# Patient Record
Sex: FEMALE
[De-identification: names, ages, dates, MRNs, and addresses within clinical notes are randomized per-mention and may not be internally consistent; named-entity substitution may affect disease eponyms.]

---

## 2016-04-15 LAB — HCV AB SER-ACNC: NONREACTIVE

## 2019-07-25 PROBLEM — Z00.00 ENCOUNTER FOR PREVENTIVE HEALTH EXAMINATION: Status: ACTIVE | Noted: 2019-07-25

## 2019-08-16 ENCOUNTER — NON-APPOINTMENT (OUTPATIENT)
Age: 59
End: 2019-08-16

## 2019-08-16 ENCOUNTER — APPOINTMENT (OUTPATIENT)
Dept: OPHTHALMOLOGY | Facility: CLINIC | Age: 59
End: 2019-08-16
Payer: COMMERCIAL

## 2019-08-16 PROCEDURE — 92250 FUNDUS PHOTOGRAPHY W/I&R: CPT

## 2019-08-16 PROCEDURE — 76514 ECHO EXAM OF EYE THICKNESS: CPT

## 2019-08-16 PROCEDURE — 92083 EXTENDED VISUAL FIELD XM: CPT

## 2019-08-16 PROCEDURE — 99204 OFFICE O/P NEW MOD 45 MIN: CPT

## 2019-08-16 PROCEDURE — 92020 GONIOSCOPY: CPT

## 2024-02-24 ENCOUNTER — APPOINTMENT (OUTPATIENT)
Dept: RADIOLOGY | Facility: CLINIC | Age: 64
End: 2024-02-24
Payer: COMMERCIAL

## 2024-02-24 DIAGNOSIS — M25.561 CHRONIC PAIN OF RIGHT KNEE: ICD-10-CM

## 2024-02-24 DIAGNOSIS — G89.29 CHRONIC PAIN OF RIGHT KNEE: ICD-10-CM

## 2024-02-24 PROCEDURE — 73564 X-RAY EXAM KNEE 4 OR MORE: CPT

## 2024-03-05 ENCOUNTER — OFFICE VISIT (OUTPATIENT)
Dept: OBGYN CLINIC | Facility: CLINIC | Age: 64
End: 2024-03-05
Payer: COMMERCIAL

## 2024-03-05 ENCOUNTER — OFFICE VISIT (OUTPATIENT)
Dept: FAMILY MEDICINE CLINIC | Facility: CLINIC | Age: 64
End: 2024-03-05
Payer: COMMERCIAL

## 2024-03-05 VITALS
HEIGHT: 63 IN | BODY MASS INDEX: 21.26 KG/M2 | TEMPERATURE: 97.5 F | WEIGHT: 120 LBS | SYSTOLIC BLOOD PRESSURE: 155 MMHG | HEART RATE: 83 BPM | RESPIRATION RATE: 16 BRPM | DIASTOLIC BLOOD PRESSURE: 89 MMHG | OXYGEN SATURATION: 98 %

## 2024-03-05 VITALS
TEMPERATURE: 97.6 F | WEIGHT: 120.5 LBS | OXYGEN SATURATION: 98 % | HEART RATE: 98 BPM | SYSTOLIC BLOOD PRESSURE: 134 MMHG | BODY MASS INDEX: 22.75 KG/M2 | DIASTOLIC BLOOD PRESSURE: 88 MMHG | HEIGHT: 61 IN

## 2024-03-05 DIAGNOSIS — Z12.11 COLON CANCER SCREENING: ICD-10-CM

## 2024-03-05 DIAGNOSIS — E87.6 HYPOKALEMIA: ICD-10-CM

## 2024-03-05 DIAGNOSIS — Z12.31 ENCOUNTER FOR SCREENING MAMMOGRAM FOR MALIGNANT NEOPLASM OF BREAST: ICD-10-CM

## 2024-03-05 DIAGNOSIS — Z13.220 LIPID SCREENING: ICD-10-CM

## 2024-03-05 DIAGNOSIS — M17.11 PRIMARY OSTEOARTHRITIS OF RIGHT KNEE: Primary | ICD-10-CM

## 2024-03-05 DIAGNOSIS — E55.9 VITAMIN D DEFICIENCY: ICD-10-CM

## 2024-03-05 DIAGNOSIS — Z78.0 POST-MENOPAUSAL: ICD-10-CM

## 2024-03-05 DIAGNOSIS — M85.80 OSTEOPENIA, UNSPECIFIED LOCATION: ICD-10-CM

## 2024-03-05 DIAGNOSIS — R73.03 PREDIABETES: ICD-10-CM

## 2024-03-05 DIAGNOSIS — Z13.29 THYROID DISORDER SCREEN: ICD-10-CM

## 2024-03-05 DIAGNOSIS — Z76.89 ENCOUNTER TO ESTABLISH CARE: Primary | ICD-10-CM

## 2024-03-05 PROBLEM — Z86.69 HX OF MIGRAINE HEADACHES: Status: ACTIVE | Noted: 2024-03-05

## 2024-03-05 PROCEDURE — 99204 OFFICE O/P NEW MOD 45 MIN: CPT | Performed by: STUDENT IN AN ORGANIZED HEALTH CARE EDUCATION/TRAINING PROGRAM

## 2024-03-05 PROCEDURE — 99204 OFFICE O/P NEW MOD 45 MIN: CPT | Performed by: FAMILY MEDICINE

## 2024-03-05 RX ORDER — POTASSIUM CHLORIDE 600 MG/1
4 TABLET, FILM COATED, EXTENDED RELEASE ORAL
Qty: 90 TABLET | Refills: 0 | Status: SHIPPED | OUTPATIENT
Start: 2024-03-05

## 2024-03-05 RX ORDER — MECLIZINE HYDROCHLORIDE 25 MG/1
25 TABLET ORAL 3 TIMES DAILY PRN
COMMUNITY
Start: 2024-01-18 | End: 2025-01-17

## 2024-03-05 RX ORDER — NAPROXEN 500 MG/1
500 TABLET ORAL 2 TIMES DAILY WITH MEALS
Qty: 60 TABLET | Refills: 0 | Status: SHIPPED | OUTPATIENT
Start: 2024-03-05

## 2024-03-05 RX ORDER — LATANOPROST 50 UG/ML
1 SOLUTION/ DROPS OPHTHALMIC
COMMUNITY
Start: 2023-12-27

## 2024-03-05 NOTE — ASSESSMENT & PLAN NOTE
Patient denies any hx of.   Review of DEXA  from 2011 does show osteopenia. I will check patients Vit D level to advise on supplementation , in addition I have ordered and staff have scheduled patient for a DEXA scan. Will f/u pending results.

## 2024-03-05 NOTE — ASSESSMENT & PLAN NOTE
Patient is a 64-year-old female with past medical history prediabetes and hyperlipidemia presenting today to establish care.  AWV last completed  9/13/2023  Mammo 10/19/2022  Colonoscopy 7/7/2014  Dxa- 9/29/2011    Mammogram and DEXA has been ordered, as well as routine blood work, will f/u pending results. Referral to GI placed for Colon cancer screening    Patient does endorse some anxiety around her weight   States that she eats primarily a KETO diet to try and stay healthy.

## 2024-03-05 NOTE — PROGRESS NOTES
Name: Albertina Alvarado      : 1960      MRN: [id number]  Encounter Provider: Cyndy Carrasco DO  Encounter Date: 3/5/2024   Encounter department: [facility]    Assessment & Plan     1. Encounter to establish care  Assessment & Plan:  Patient is a 64-year-old female with past medical history prediabetes and hyperlipidemia presenting today to establish care.  AWV last completed  2023  Mammo 10/19/2022  Colonoscopy 2014  Dxa- 2011    Mammogram and DEXA has been ordered, as well as routine blood work, will f/u pending results. Referral to GI placed for Colon cancer screening    Patient does endorse some anxiety around her weight   States that she eats primarily a KETO diet to try and stay healthy.      2. Encounter for screening mammogram for malignant neoplasm of breast  -     Mammo screening bilateral w 3d & cad; Future    3. Post-menopausal  -     DXA bone density spine hip and pelvis; Future; Expected date: 2024  -     CBC and Platelet; Future    4. Prediabetes  -     Comprehensive metabolic panel; Future  -     Hemoglobin A1C; Future  -     CBC and Platelet; Future    5. Thyroid disorder screen  -     TSH, 3rd generation with Free T4 reflex; Future    6. Lipid screening  -     Lipid panel; Future    7. Vitamin D deficiency  Assessment & Plan:  Vit D level pending. Patient taking MTV, no exclusive supplement. Will f/u pending results.     Orders:  -     Vitamin D 25 hydroxy; Future    8. Colon cancer screening  -     Ambulatory Referral to Gastroenterology; Future    9. Osteopenia, unspecified location  Assessment & Plan:  Patient denies any hx of.   Review of DEXA  from  does show osteopenia. I will check patients Vit D level to advise on supplementation , in addition I have ordered and staff have scheduled patient for a DEXA scan. Will f/u pending results.       10. Hypokalemia  Assessment & Plan:  Patient reports long hx of hypokalemia   Has been  "taking half tablet nightly, recent blood work from ED showing Potasium wnl.  Will recheck levels and advise on any dose changes, for now will refill at 4mg qhs.            Subjective      Patient is a 64-year-old female with past medical history prediabetes and hyperlipidemia presenting today to establish care.  Patient has no acute concerns or complaints  Endorses falling behind on preventative screenings after relocating from [facility]      Review of Systems   Constitutional:  Negative for chills and fever.   HENT:  Negative for congestion and rhinorrhea.    Respiratory:  Negative for cough and shortness of breath.    Cardiovascular:  Negative for chest pain and palpitations.   Gastrointestinal:  Negative for abdominal pain, constipation, diarrhea, nausea and vomiting.   Neurological:  Negative for dizziness and headaches.       Current Outpatient Medications on File Prior to Visit   Medication Sig   • aspirin (ECOTRIN LOW STRENGTH) 81 mg EC tablet Take 81 mg by mouth   • atorvastatin (LIPITOR) 10 mg tablet TAKE 1 TABLET NIGHTLY FOR HIGH AMOUNT OF FATS IN THE BLOOD.   • latanoprost (XALATAN) 0.005 % ophthalmic solution Administer 1 drop to both eyes daily at bedtime   • meclizine (ANTIVERT) 25 mg tablet Take 25 mg by mouth Three times daily as needed for dizziness   • [DISCONTINUED] potassium chloride (KLOR-CON) 8 MEQ tablet Take 8 mEq by mouth 2 (two) times a day       Objective     /88 (BP Location: Left arm, Patient Position: Sitting, Cuff Size: Large)   Pulse 98   Temp 97.6 °F (36.4 °C) (Temporal)   Ht 5' 1\" (1.549 m)   Wt 54.7 kg (120 lb 8 oz)   SpO2 98%   BMI 22.77 kg/m²     Physical Exam  Vitals reviewed.   Constitutional:       Appearance: Normal appearance.   HENT:      Head: Normocephalic and atraumatic.   Eyes:      Extraocular Movements: Extraocular movements intact.   Cardiovascular:      Rate and Rhythm: Normal rate and regular rhythm.      Heart sounds: Normal heart sounds.   Pulmonary:      " Effort: Pulmonary effort is normal.      Breath sounds: Normal breath sounds.   Musculoskeletal:      Cervical back: Neck supple. No tenderness.   Lymphadenopathy:      Cervical: No cervical adenopathy.   Neurological:      General: No focal deficit present.      Mental Status: She is alert and oriented to person, place, and time.   Psychiatric:         Mood and Affect: Mood normal.         Behavior: Behavior normal.           Cyndy Carrasco, DO

## 2024-03-05 NOTE — ASSESSMENT & PLAN NOTE
Patient reports long hx of hypokalemia   Has been taking half tablet nightly, recent blood work from ED showing Potasium wnl.  Will recheck levels and advise on any dose changes, for now will refill at 4mg qhs.

## 2024-03-05 NOTE — PROGRESS NOTES
Subjective:    Chief Complaint   Patient presents with    Right Knee - Pain       Albertina Alvarado is a 64 y.o. female complains of right knee pain. Onset of the symptoms was several weeks ago.  Mechanism of injury:  was standing at sink and backbended her knee resulting in pain . Aggravating factors: walking  and weight bearing. Treatment to date: OTC analgesics which are somewhat effective and rest. Symptoms have progressed to a point and plateaued.      The following portions were reviewed and updated as needed: allergies, current medications, past medical history, past social history, past surgical history and problem list.    Review of Systems   Constitutional: Negative for fever.   HENT: Negative for dental problem and headaches.    Eyes: Negative for vision loss.   Respiratory: Negative for cough and shortness of breath.    Cardiovascular: Negative for leg swelling and palpitations.   Gastrointestinal: Negative for constipation and diarrhea.   Genitourinary: Negative for bladder incontinence and difficulty urinating.   Musculoskeletal: Negative for back pain and difficulty walking.   Skin: Negative for rash and ulcer.   Neurological: Negative for dizziness and headaches.   Hem/Lymph/Immuno: Negative for blood clots. Does not bruise/bleed easily.   Psychiatric/Behavioral: Negative for confusion.         Objective:  General: no acute distress, non toxic, AAO x3   Skin: no skin changes, no rashes, no wounds or laceration  Vasculature: normal cap refill, no LE edema, normal popliteal and dorsalis pedis pulse  Neurologic:   Musculoskeletal: right KNEE EXAM  Gait: limping gait negative, able to weight bear without difficulty  Inspection: No gross deformity, no redness or warmth   Effusion: negative   Medial joint line TTP: positive  Lateral joint line TTP: negative  ROM: Full flexion and extension  [facility]'s: negative,   Instability to varus/valgus stress: negative  Anterior Drawer: negative   Lachman's test:  negative  Posterior Drawer: negative          Imaging:       Assessment/Plan:  1. Primary osteoarthritis of right knee  64-year-old female patient presenting for acute right knee pain.  Radiographs from the emergency room were reviewed.  I do notice some degenerative changes in the medial compartment with subchondral sclerosis and osteophyte formation.  My clinical impression is that patient is suffering from exacerbation of underlying knee osteoarthritis.  Discussed the nature of knee osteoarthritis and the recommended treatment plan.  Patient will begin with naproxen 500 mg twice daily with food.  Advised to ice the affected area and apply Voltaren gel.  She will be provided a hinged knee brace for comfort use.  We discussed role for corticosteroid injection however she declines at today's visit.  We will revisit again in about 1 month and if symptoms are persisting we can consider the corticosteroid injection at that time  - Ambulatory Referral to Orthopedic Surgery  - Durable Medical Equipment  - naproxen (Naprosyn) 500 mg tablet; Take 1 tablet (500 mg total) by mouth 2 (two) times a day with meals  Dispense: 60 tablet; Refill: 0

## 2024-03-07 ENCOUNTER — APPOINTMENT (OUTPATIENT)
Dept: LAB | Facility: CLINIC | Age: 64
End: 2024-03-07
Payer: COMMERCIAL

## 2024-03-07 DIAGNOSIS — Z13.220 LIPID SCREENING: ICD-10-CM

## 2024-03-07 DIAGNOSIS — Z78.0 POST-MENOPAUSAL: ICD-10-CM

## 2024-03-07 DIAGNOSIS — E55.9 VITAMIN D DEFICIENCY: ICD-10-CM

## 2024-03-07 DIAGNOSIS — Z13.29 THYROID DISORDER SCREEN: ICD-10-CM

## 2024-03-07 DIAGNOSIS — R73.03 PREDIABETES: ICD-10-CM

## 2024-03-07 LAB
25(OH)D3 SERPL-MCNC: 49.8 NG/ML (ref 30–100)
ALBUMIN SERPL BCP-MCNC: 4.1 G/DL (ref 3.5–5)
ALP SERPL-CCNC: 51 U/L (ref 34–104)
ALT SERPL W P-5'-P-CCNC: 12 U/L (ref 7–52)
ANION GAP SERPL CALCULATED.3IONS-SCNC: 9 MMOL/L
AST SERPL W P-5'-P-CCNC: 20 U/L (ref 13–39)
BILIRUB SERPL-MCNC: 0.31 MG/DL (ref 0.2–1)
BUN SERPL-MCNC: 18 MG/DL (ref 5–25)
CALCIUM SERPL-MCNC: 9.5 MG/DL (ref 8.4–10.2)
CHLORIDE SERPL-SCNC: 105 MMOL/L (ref 96–108)
CHOLEST SERPL-MCNC: 223 MG/DL
CO2 SERPL-SCNC: 28 MMOL/L (ref 21–32)
CREAT SERPL-MCNC: 0.72 MG/DL (ref 0.6–1.3)
ERYTHROCYTE [DISTWIDTH] IN BLOOD BY AUTOMATED COUNT: 14.6 % (ref 11.6–15.1)
EST. AVERAGE GLUCOSE BLD GHB EST-MCNC: 123 MG/DL
GFR SERPL CREATININE-BSD FRML MDRD: 87 ML/MIN/1.73SQ M
GLUCOSE P FAST SERPL-MCNC: 83 MG/DL (ref 65–99)
HBA1C MFR BLD: 5.9 %
HCT VFR BLD AUTO: 42 % (ref 34.8–46.1)
HDLC SERPL-MCNC: 71 MG/DL
HGB BLD-MCNC: 13.5 G/DL (ref 11.5–15.4)
LDLC SERPL CALC-MCNC: 141 MG/DL (ref 0–100)
MCH RBC QN AUTO: 31.7 PG (ref 26.8–34.3)
MCHC RBC AUTO-ENTMCNC: 32.1 G/DL (ref 31.4–37.4)
MCV RBC AUTO: 99 FL (ref 82–98)
NONHDLC SERPL-MCNC: 152 MG/DL
PLATELET # BLD AUTO: 261 THOUSANDS/UL (ref 149–390)
PMV BLD AUTO: 9.9 FL (ref 8.9–12.7)
POTASSIUM SERPL-SCNC: 5 MMOL/L (ref 3.5–5.3)
PROT SERPL-MCNC: 7.2 G/DL (ref 6.4–8.4)
RBC # BLD AUTO: 4.26 MILLION/UL (ref 3.81–5.12)
SODIUM SERPL-SCNC: 142 MMOL/L (ref 135–147)
TRIGL SERPL-MCNC: 53 MG/DL
TSH SERPL DL<=0.05 MIU/L-ACNC: 2.6 UIU/ML (ref 0.45–4.5)
WBC # BLD AUTO: 6.22 THOUSAND/UL (ref 4.31–10.16)

## 2024-03-07 PROCEDURE — 36415 COLL VENOUS BLD VENIPUNCTURE: CPT

## 2024-03-07 PROCEDURE — 80053 COMPREHEN METABOLIC PANEL: CPT

## 2024-03-07 PROCEDURE — 84443 ASSAY THYROID STIM HORMONE: CPT

## 2024-03-07 PROCEDURE — 85027 COMPLETE CBC AUTOMATED: CPT

## 2024-03-07 PROCEDURE — 80061 LIPID PANEL: CPT

## 2024-03-07 PROCEDURE — 83036 HEMOGLOBIN GLYCOSYLATED A1C: CPT

## 2024-03-07 PROCEDURE — 82306 VITAMIN D 25 HYDROXY: CPT

## 2024-03-13 ENCOUNTER — TELEPHONE (OUTPATIENT)
Age: 64
End: 2024-03-13

## 2024-03-13 NOTE — TELEPHONE ENCOUNTER
03/13/24  Screened by: Violetta Aviles    Referring Provider DO Lu    Pre- Screening: Yes    There is no height or weight on file to calculate BMI.  Has patient been referred for a routine screening Colonoscopy? yes  Is the patient between 45-75 years old? yes      Previous Colonoscopy yes   If yes:    Date: 3-4yrs    Facility:     Reason:     Does the patient want to see a Gastroenterologist prior to their procedure OR are they having any GI symptoms? no    Has the patient been hospitalized or had abdominal surgery in the past 6 months? no    Does the patient use supplemental oxygen? no    Does the patient take Coumadin, Lovenox, Plavix, Elliquis, Xarelto, or other blood thinning medication? no    Has the patient had a stroke, cardiac event, or stent placed in the past year? no

## 2024-03-13 NOTE — TELEPHONE ENCOUNTER
Patients GI provider:  new pt    Number to return call: [phone]    Reason for call: Pt calling to sched colonoscopy. Would like Sat. Will check w/PCP to see if proced has to be w/Dr. Perez as referred or if ok to go w/another GI doc. Will call back to sched.    Scheduled procedure/appointment date if applicable: N/A

## 2024-04-09 ENCOUNTER — HOSPITAL ENCOUNTER (OUTPATIENT)
Age: 64
Discharge: HOME | End: 2024-04-09
Payer: COMMERCIAL

## 2024-04-09 ENCOUNTER — OFFICE VISIT (OUTPATIENT)
Dept: OBGYN CLINIC | Facility: CLINIC | Age: 64
End: 2024-04-09
Payer: COMMERCIAL

## 2024-04-09 VITALS — BODY MASS INDEX: 23.83 KG/M2 | HEIGHT: 60 IN

## 2024-04-09 VITALS
WEIGHT: 122 LBS | HEART RATE: 69 BPM | BODY MASS INDEX: 21.62 KG/M2 | DIASTOLIC BLOOD PRESSURE: 87 MMHG | RESPIRATION RATE: 18 BRPM | HEIGHT: 63 IN | SYSTOLIC BLOOD PRESSURE: 146 MMHG | OXYGEN SATURATION: 98 % | TEMPERATURE: 97.7 F

## 2024-04-09 DIAGNOSIS — Z12.31 ENCOUNTER FOR SCREENING MAMMOGRAM FOR MALIGNANT NEOPLASM OF BREAST: ICD-10-CM

## 2024-04-09 DIAGNOSIS — M17.11 PRIMARY OSTEOARTHRITIS OF RIGHT KNEE: Primary | ICD-10-CM

## 2024-04-09 DIAGNOSIS — Z78.0 POST-MENOPAUSAL: ICD-10-CM

## 2024-04-09 PROCEDURE — 77067 SCR MAMMO BI INCL CAD: CPT

## 2024-04-09 PROCEDURE — 99213 OFFICE O/P EST LOW 20 MIN: CPT | Performed by: FAMILY MEDICINE

## 2024-04-09 PROCEDURE — 77080 DXA BONE DENSITY AXIAL: CPT

## 2024-04-09 PROCEDURE — 77063 BREAST TOMOSYNTHESIS BI: CPT

## 2024-04-09 NOTE — PROGRESS NOTES
Subjective:    Chief Complaint   Patient presents with    Right Knee - Follow-up       Albertina Alvarado is a 64 y.o. female complains of right knee pain. Onset of the symptoms was several weeks ago.  Mechanism of injury:  was standing at sink and backbended her knee resulting in pain . Aggravating factors: none. Treatment to date: OTC analgesics which are somewhat effective and rest. Symptoms have essentially resolved.      The following portions were reviewed and updated as needed: allergies, current medications, past medical history, past social history, past surgical history and problem list.    Review of Systems   Constitutional: Negative for fever.   HENT: Negative for dental problem and headaches.    Eyes: Negative for vision loss.   Respiratory: Negative for cough and shortness of breath.    Cardiovascular: Negative for leg swelling and palpitations.   Gastrointestinal: Negative for constipation and diarrhea.   Genitourinary: Negative for bladder incontinence and difficulty urinating.   Musculoskeletal: Negative for back pain and difficulty walking.   Skin: Negative for rash and ulcer.   Neurological: Negative for dizziness and headaches.   Hem/Lymph/Immuno: Negative for blood clots. Does not bruise/bleed easily.   Psychiatric/Behavioral: Negative for confusion.         Objective:  General: no acute distress, non toxic, AAO x3   Skin: no skin changes, no rashes, no wounds or laceration  Vasculature: normal cap refill, no LE edema, normal popliteal and dorsalis pedis pulse  Neurologic:   Musculoskeletal: right KNEE EXAM  Gait: limping gait negative, able to weight bear without difficulty  Inspection: No gross deformity, no redness or warmth   Effusion: negative   Medial joint line TTP: negative  Lateral joint line TTP: negative  ROM: Full flexion and extension  [facility]'s: negative,   Instability to varus/valgus stress: negative  Anterior Drawer: negative   Lachman's test: negative  Posterior Drawer:  negative          Imaging:       Assessment/Plan:  1. Primary osteoarthritis of right knee  Patient reports complete resolution of pain symptoms with bracing and oral NSAID medication.  Her examination is unremarkable at today's visit.  Advised that she can follow-up as needed if symptoms do recur.

## 2024-05-13 ENCOUNTER — TELEPHONE (OUTPATIENT)
Dept: MAMMOGRAPHY | Facility: CLINIC | Age: 64
End: 2024-05-13

## 2024-05-31 ENCOUNTER — TELEPHONE (OUTPATIENT)
Age: 64
End: 2024-05-31

## 2024-05-31 NOTE — TELEPHONE ENCOUNTER
Scheduled date of colonoscopy (as of today):06/19/2024  Physician performing colonoscopy:Dr Perez  Location of colonoscopy:[facility]  Bowel prep reviewed with patient:miralax/dul  Instructions reviewed with patient by:sent via my chart   Clearances: n/a

## 2024-06-19 ENCOUNTER — HOSPITAL ENCOUNTER (OUTPATIENT)
Dept: GASTROENTEROLOGY | Facility: HOSPITAL | Age: 64
Setting detail: OUTPATIENT SURGERY
Discharge: HOME | End: 2024-06-19
Attending: INTERNAL MEDICINE
Payer: COMMERCIAL

## 2024-06-19 ENCOUNTER — ANESTHESIA EVENT (OUTPATIENT)
Dept: GASTROENTEROLOGY | Facility: HOSPITAL | Age: 64
End: 2024-06-19

## 2024-06-19 ENCOUNTER — ANESTHESIA (OUTPATIENT)
Dept: GASTROENTEROLOGY | Facility: HOSPITAL | Age: 64
End: 2024-06-19

## 2024-06-19 VITALS
TEMPERATURE: 99 F | RESPIRATION RATE: 20 BRPM | BODY MASS INDEX: 23.94 KG/M2 | DIASTOLIC BLOOD PRESSURE: 73 MMHG | HEART RATE: 63 BPM | WEIGHT: 121.91 LBS | HEIGHT: 60 IN | OXYGEN SATURATION: 100 % | SYSTOLIC BLOOD PRESSURE: 136 MMHG

## 2024-06-19 DIAGNOSIS — Z12.11 SCREENING FOR COLON CANCER: ICD-10-CM

## 2024-06-19 PROCEDURE — G0121 COLON CA SCRN NOT HI RSK IND: HCPCS | Performed by: INTERNAL MEDICINE

## 2024-06-19 RX ORDER — LIDOCAINE HYDROCHLORIDE 10 MG/ML
INJECTION, SOLUTION EPIDURAL; INFILTRATION; INTRACAUDAL; PERINEURAL AS NEEDED
Status: DISCONTINUED | OUTPATIENT
Start: 2024-06-19 | End: 2024-06-19

## 2024-06-19 RX ORDER — SODIUM CHLORIDE, SODIUM LACTATE, POTASSIUM CHLORIDE, CALCIUM CHLORIDE 600; 310; 30; 20 MG/100ML; MG/100ML; MG/100ML; MG/100ML
INJECTION, SOLUTION INTRAVENOUS CONTINUOUS PRN
Status: DISCONTINUED | OUTPATIENT
Start: 2024-06-19 | End: 2024-06-19

## 2024-06-19 RX ORDER — PROPOFOL 10 MG/ML
INJECTION, EMULSION INTRAVENOUS AS NEEDED
Status: DISCONTINUED | OUTPATIENT
Start: 2024-06-19 | End: 2024-06-19

## 2024-06-19 RX ADMIN — PROPOFOL 35 MG: 10 INJECTION, EMULSION INTRAVENOUS at 11:56

## 2024-06-19 RX ADMIN — PROPOFOL 35 MG: 10 INJECTION, EMULSION INTRAVENOUS at 11:50

## 2024-06-19 RX ADMIN — PROPOFOL 50 MG: 10 INJECTION, EMULSION INTRAVENOUS at 11:47

## 2024-06-19 RX ADMIN — SODIUM CHLORIDE, SODIUM LACTATE, POTASSIUM CHLORIDE, AND CALCIUM CHLORIDE: .6; .31; .03; .02 INJECTION, SOLUTION INTRAVENOUS at 11:32

## 2024-06-19 RX ADMIN — PROPOFOL 100 MG: 10 INJECTION, EMULSION INTRAVENOUS at 11:46

## 2024-06-19 RX ADMIN — LIDOCAINE HYDROCHLORIDE 50 MG: 10 INJECTION, SOLUTION EPIDURAL; INFILTRATION; INTRACAUDAL; PERINEURAL at 11:45

## 2024-06-19 RX ADMIN — PROPOFOL 25 MG: 10 INJECTION, EMULSION INTRAVENOUS at 11:59

## 2024-06-19 RX ADMIN — PROPOFOL 35 MG: 10 INJECTION, EMULSION INTRAVENOUS at 11:53

## 2024-06-19 NOTE — H&P
History and Physical -  [facility]  Albertina Alvarado 64 y.o. female MRN: [id number]                  HPI: Albertina Alvarado is a 64 y.o. year old female who presents for colonoscopy for colon cancer screening      REVIEW OF SYSTEMS: Per the HPI, and otherwise unremarkable.    Historical Information   Past Medical History:   Diagnosis Date    Anxiety 2023    Depression 4 years    Headache(784.0) 24 years    Shingles      Past Surgical History:   Procedure Laterality Date    BREAST BIOPSY Right 2014    BREAST BIOPSY Left 2014    EYE SURGERY      HAND SURGERY      US GUIDED BREAST BIOPSY LEFT COMPLETE Left 2014     Social History   Social History     Substance and Sexual Activity   Alcohol Use Never     Social History     Substance and Sexual Activity   Drug Use Never     Social History     Tobacco Use   Smoking Status Former    Average packs/day: 0.5 packs/day for 30.0 years (15.0 ttl pk-yrs)    Types: Cigarettes    Start date: 2023    Quit date:     Years since quittin.4    Passive exposure: Never   Smokeless Tobacco Never     Family History   Problem Relation Age of Onset    Heart disease Mother          2012    Hypertension Father          1988    Cancer Father          1988    No Known Problems Sister     No Known Problems Daughter     No Known Problems Maternal Grandmother     No Known Problems Paternal Grandmother     Depression Son     Asthma Son         Out-grew in his teens    No Known Problems Maternal Aunt     No Known Problems Paternal Aunt     No Known Problems Paternal Aunt     No Known Problems Paternal Aunt     Breast cancer Neg Hx        Meds/Allergies     Not in a hospital admission.    No Known Allergies    Objective     Blood pressure 124/66, pulse 71, temperature 98 °F (36.7 °C), temperature source Tympanic, resp. rate 16, height 5' (1.524 m), weight 55.3 kg (121 lb 14.6 oz), SpO2 100%.      PHYSICAL EXAM    BP  124/66   Pulse 71   Temp 98 °F (36.7 °C) (Tympanic)   Resp 16   Ht 5' (1.524 m)   Wt 55.3 kg (121 lb 14.6 oz)   SpO2 100%   BMI 23.81 kg/m²       Gen: NAD  CV: RRR  CHEST: Clear  ABD: soft, NT/ND  EXT: no edema      ASSESSMENT/PLAN:  This is a 64 y.o. year old female here for colonoscopy, and she is stable and optimized for her procedure.

## 2024-06-19 NOTE — ANESTHESIA POSTPROCEDURE EVALUATION
Post-Op Assessment Note    CV Status:  Stable  Pain Score: 0    Pain management: adequate       Mental Status:  Sleepy and arousable   Hydration Status:  Euvolemic   PONV Controlled:  Controlled   Airway Patency:  Patent     Post Op Vitals Reviewed: Yes    No anethesia notable event occurred.    Staff: CRNA               BP   124/66   Temp 99   Pulse 74   Resp 17   SpO2 99

## 2024-06-19 NOTE — RESULT ENCOUNTER NOTE
IMPRESSION:  · The cecum, ascending colon, hepatic flexure, transverse colon, splenic flexure, descending colon, sigmoid colon, rectosigmoid and rectum appeared normal.        RECOMMENDATION:  Repeat colonoscopy in 5 years, due: 6/18/2029  · Family history of colon cancer              Gopi Perez III, MD

## 2024-06-25 ENCOUNTER — CONSULT (OUTPATIENT)
Dept: FAMILY MEDICINE CLINIC | Facility: CLINIC | Age: 64
End: 2024-06-25
Payer: COMMERCIAL

## 2024-06-25 ENCOUNTER — LAB (OUTPATIENT)
Dept: LAB | Facility: CLINIC | Age: 64
End: 2024-06-25
Payer: COMMERCIAL

## 2024-06-25 VITALS
SYSTOLIC BLOOD PRESSURE: 136 MMHG | OXYGEN SATURATION: 98 % | HEART RATE: 70 BPM | HEIGHT: 60 IN | BODY MASS INDEX: 24.15 KG/M2 | TEMPERATURE: 97 F | DIASTOLIC BLOOD PRESSURE: 78 MMHG | WEIGHT: 123 LBS

## 2024-06-25 DIAGNOSIS — Z01.818 PREOPERATIVE CLEARANCE: Primary | ICD-10-CM

## 2024-06-25 DIAGNOSIS — R73.03 PREDIABETES: ICD-10-CM

## 2024-06-25 DIAGNOSIS — H40.50X0 GLAUCOMA SECONDARY TO OTHER EYE DISORDER, UNSPECIFIED GLAUCOMA STAGE, UNSPECIFIED LATERALITY: ICD-10-CM

## 2024-06-25 PROBLEM — M18.11 ARTHRITIS OF CARPOMETACARPAL (CMC) JOINT OF RIGHT THUMB: Status: ACTIVE | Noted: 2020-09-09

## 2024-06-25 PROBLEM — H40.1233: Status: ACTIVE | Noted: 2024-06-21

## 2024-06-25 LAB
ALBUMIN SERPL BCG-MCNC: 4.3 G/DL (ref 3.5–5)
ALP SERPL-CCNC: 54 U/L (ref 34–104)
ALT SERPL W P-5'-P-CCNC: 15 U/L (ref 7–52)
ANION GAP SERPL CALCULATED.3IONS-SCNC: 8 MMOL/L (ref 4–13)
AST SERPL W P-5'-P-CCNC: 21 U/L (ref 13–39)
BASOPHILS # BLD AUTO: 0.03 THOUSANDS/ÂΜL (ref 0–0.1)
BASOPHILS NFR BLD AUTO: 1 % (ref 0–1)
BILIRUB SERPL-MCNC: 0.25 MG/DL (ref 0.2–1)
BILIRUB UR QL STRIP: NEGATIVE
BUN SERPL-MCNC: 19 MG/DL (ref 5–25)
CALCIUM SERPL-MCNC: 9.4 MG/DL (ref 8.4–10.2)
CHLORIDE SERPL-SCNC: 105 MMOL/L (ref 96–108)
CLARITY UR: CLEAR
CO2 SERPL-SCNC: 30 MMOL/L (ref 21–32)
COLOR UR: COLORLESS
CREAT SERPL-MCNC: 0.74 MG/DL (ref 0.6–1.3)
ECG INTERP BEFORE EX: NORMAL
EOSINOPHIL # BLD AUTO: 0.04 THOUSAND/ÂΜL (ref 0–0.61)
EOSINOPHIL NFR BLD AUTO: 1 % (ref 0–6)
ERYTHROCYTE [DISTWIDTH] IN BLOOD BY AUTOMATED COUNT: 13.8 % (ref 11.6–15.1)
EST. AVERAGE GLUCOSE BLD GHB EST-MCNC: 117 MG/DL
GFR SERPL CREATININE-BSD FRML MDRD: 84 ML/MIN/1.73SQ M
GLUCOSE P FAST SERPL-MCNC: 77 MG/DL (ref 65–99)
GLUCOSE UR STRIP-MCNC: NEGATIVE MG/DL
HBA1C MFR BLD: 5.7 %
HCT VFR BLD AUTO: 42.4 % (ref 34.8–46.1)
HGB BLD-MCNC: 13.2 G/DL (ref 11.5–15.4)
HGB UR QL STRIP.AUTO: NEGATIVE
IMM GRANULOCYTES # BLD AUTO: 0.01 THOUSAND/UL (ref 0–0.2)
IMM GRANULOCYTES NFR BLD AUTO: 0 % (ref 0–2)
KETONES UR STRIP-MCNC: NEGATIVE MG/DL
LEUKOCYTE ESTERASE UR QL STRIP: NEGATIVE
LYMPHOCYTES # BLD AUTO: 2.73 THOUSANDS/ÂΜL (ref 0.6–4.47)
LYMPHOCYTES NFR BLD AUTO: 51 % (ref 14–44)
MCH RBC QN AUTO: 32 PG (ref 26.8–34.3)
MCHC RBC AUTO-ENTMCNC: 31.1 G/DL (ref 31.4–37.4)
MCV RBC AUTO: 103 FL (ref 82–98)
MONOCYTES # BLD AUTO: 0.33 THOUSAND/ÂΜL (ref 0.17–1.22)
MONOCYTES NFR BLD AUTO: 6 % (ref 4–12)
NEUTROPHILS # BLD AUTO: 2.17 THOUSANDS/ÂΜL (ref 1.85–7.62)
NEUTS SEG NFR BLD AUTO: 41 % (ref 43–75)
NITRITE UR QL STRIP: NEGATIVE
NRBC BLD AUTO-RTO: 0 /100 WBCS
PH UR STRIP.AUTO: 6.5 [PH]
PLATELET # BLD AUTO: 270 THOUSANDS/UL (ref 149–390)
PMV BLD AUTO: 10.2 FL (ref 8.9–12.7)
POTASSIUM SERPL-SCNC: 4.8 MMOL/L (ref 3.5–5.3)
PROT SERPL-MCNC: 7.5 G/DL (ref 6.4–8.4)
PROT UR STRIP-MCNC: NEGATIVE MG/DL
RBC # BLD AUTO: 4.12 MILLION/UL (ref 3.81–5.12)
SODIUM SERPL-SCNC: 143 MMOL/L (ref 135–147)
SP GR UR STRIP.AUTO: 1.01 (ref 1–1.03)
TSH SERPL DL<=0.05 MIU/L-ACNC: 1.9 UIU/ML (ref 0.45–4.5)
UROBILINOGEN UR STRIP-ACNC: <2 MG/DL
WBC # BLD AUTO: 5.31 THOUSAND/UL (ref 4.31–10.16)

## 2024-06-25 PROCEDURE — 81003 URINALYSIS AUTO W/O SCOPE: CPT

## 2024-06-25 PROCEDURE — 99214 OFFICE O/P EST MOD 30 MIN: CPT | Performed by: NURSE PRACTITIONER

## 2024-06-25 PROCEDURE — 93000 ELECTROCARDIOGRAM COMPLETE: CPT | Performed by: NURSE PRACTITIONER

## 2024-06-25 PROCEDURE — 36415 COLL VENOUS BLD VENIPUNCTURE: CPT | Performed by: NURSE PRACTITIONER

## 2024-06-25 PROCEDURE — 83036 HEMOGLOBIN GLYCOSYLATED A1C: CPT | Performed by: NURSE PRACTITIONER

## 2024-06-25 PROCEDURE — 80053 COMPREHEN METABOLIC PANEL: CPT | Performed by: NURSE PRACTITIONER

## 2024-06-25 PROCEDURE — 85025 COMPLETE CBC W/AUTO DIFF WBC: CPT | Performed by: NURSE PRACTITIONER

## 2024-06-25 PROCEDURE — 84443 ASSAY THYROID STIM HORMONE: CPT | Performed by: NURSE PRACTITIONER

## 2024-06-25 NOTE — PROGRESS NOTES
Ambulatory Visit- Patient of Dr Carrasco   Name: Albertina Alvarado      : 1960      MRN: [id number]  Encounter Provider: SHIV Oro  Encounter Date: 2024   Encounter department: [facility]    Patient is a 64-year-old female presents in office for preop clearance for eye surgery.  She has seen Dr. Steele in the past I am covering for her.   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   EKG done in the office sinus bradycardia otherwise normal heart rate 58.   She is due for blood work which I have ordered she is on to get it done today I will review and finish clearance at that point.   She denies any chest pains no palpitations intermittent skipped beats she has had them for a while.   Reviewed medications   Will review blood work and complete clearance as long as labs are stable   Assessment & Plan   1. Preoperative clearance  -     Comprehensive metabolic panel  -     CBC and differential  -     TSH, 3rd generation with Free T4 reflex  -     Hemoglobin A1C  -     POCT ECG  2. 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 Prediabetes  -     Comprehensive metabolic panel  -     CBC and differential  -     TSH, 3rd generation with Free T4 reflex  -     Hemoglobin A1C  -     POCT ECG      Depression Screening and Follow-up Plan: Patient was screened for depression during today's encounter. They screened negative with a PHQ-2 score of 0.    Falls Plan of Care: balance, strength, and gait training instructions were provided. Recommended assistive device to help with gait and balance. Medications that increase falls were reviewed. Assessed feet and footwear. Vitamin D supplementation was recommended. Home  safety education provided.         History of Present Illness     Patient is a 64-year-old female presents in office for preop clearance for eye surgery.  She has seen Dr. Steele in the past I am covering for her.   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   EKG done in the office sinus bradycardia otherwise normal heart rate 58.   She is due for blood work which I have ordered she is on to get it done today I will review and finish clearance at that point.       Review of Systems   Constitutional:  Negative for fatigue, fever and unexpected weight change.   HENT:  Negative for congestion, dental problem, rhinorrhea, sore throat and voice change.    Eyes:         Eye issues - glaucoma    Respiratory:  Negative for cough and shortness of breath.    Cardiovascular:  Negative for chest pain and palpitations.        Intermittent skipped beats    Gastrointestinal:  Negative for abdominal distention, abdominal pain, nausea and vomiting.   Endocrine: Negative.    Genitourinary:  Negative for difficulty urinating and flank pain.   Musculoskeletal:  Negative for arthralgias.   Allergic/Immunologic: Negative for environmental allergies.   Neurological:  Negative for headaches.   Hematological:  Negative for adenopathy.   Psychiatric/Behavioral:  Negative for sleep disturbance and suicidal ideas. The patient is not nervous/anxious.      Past Medical History:   Diagnosis Date    Anxiety 2023    Depression 4 years    Headache(784.0) 24 years    Shingles      Past Surgical History:   Procedure Laterality Date    BREAST BIOPSY Right 2014    BREAST BIOPSY Left 2014    EYE SURGERY      HAND SURGERY      US GUIDED BREAST BIOPSY LEFT COMPLETE Left 2014     Family History   Problem Relation Age of Onset    Heart disease Mother          2012    Hypertension Father          1988    Cancer Father           1988    No Known Problems Sister     No Known Problems Daughter     No Known Problems Maternal Grandmother     No Known Problems Paternal Grandmother     Depression Son     Asthma Son         Out-grew in his teens    No Known Problems Maternal Aunt     No Known Problems Paternal Aunt     No Known Problems Paternal Aunt     No Known Problems Paternal Aunt     Breast cancer Neg Hx      Social History     Tobacco Use    Smoking status: Former     Average packs/day: 0.5 packs/day for 30.0 years (15.0 ttl pk-yrs)     Types: Cigarettes     Start date: 2023     Quit date:      Years since quittin.5     Passive exposure: Never    Smokeless tobacco: Never   Vaping Use    Vaping status: Never Used   Substance and Sexual Activity    Alcohol use: Never    Drug use: Never    Sexual activity: Not Currently     Partners: Male     Birth control/protection: None     Current Outpatient Medications on File Prior to Visit   Medication Sig    aspirin (ECOTRIN LOW STRENGTH) 81 mg EC tablet Take 81 mg by mouth daily at bedtime    atorvastatin (LIPITOR) 10 mg tablet Take 10 mg by mouth daily at bedtime    brimonidine tartrate 0.2 % ophthalmic solution instill 1 drop into both eyes twice a day    Calcium Citrate-Vitamin D (CALCIUM CITRATE + D PO) Take by mouth    latanoprost (XALATAN) 0.005 % ophthalmic solution Administer 1 drop to both eyes daily at bedtime    meclizine (ANTIVERT) 25 mg tablet Take 25 mg by mouth Three times daily as needed for dizziness    Melatonin 10 MG TABS Take by mouth    potassium chloride (KLOR-CON) 8 MEQ tablet Take 0.5 tablets (4 mEq total) by mouth daily at bedtime    VITAMIN D, CHOLECALCIFEROL, PO Take 25 mcg by mouth    [DISCONTINUED] naproxen (Naprosyn) 500 mg tablet Take 1 tablet (500 mg total) by mouth 2 (two) times a day with meals (Patient not taking: Reported on 2024)     No Known Allergies  Immunization History   Administered Date(s) Administered    COVID-19 MODERNA VACC  0.5 ML IM 04/06/2021, 05/04/2021, 11/18/2021    INFLUENZA 02/05/2016, 10/20/2018, 09/25/2021    Influenza Injectable, MDCK, Preservative Free, Quadrivalent 11/13/2019    Pneumococcal Conjugate Vaccine 20-valent (Pcv20), Polysace 09/13/2023    Respiratory Syncytial Virus Vaccine (Recombinant, Adjuvanted) 11/03/2023    TD (adult) Preservative Free 09/13/2023    Tdap 07/26/2011    Zoster Vaccine Recombinant 11/18/2021, 01/22/2022    influenza, trivalent, adjuvanted 10/30/2002, 10/25/2010     Objective     /78 (BP Location: Left arm, Patient Position: Sitting, Cuff Size: Adult)   Pulse 70   Temp (!) 97 °F (36.1 °C)   Ht 5' (1.524 m)   Wt 55.8 kg (123 lb)   SpO2 98%   BMI 24.02 kg/m²     Physical Exam  Vitals and nursing note reviewed.   Constitutional:       Appearance: Normal appearance.   HENT:      Head: Atraumatic.   Eyes:      Extraocular Movements: Extraocular movements intact.   Cardiovascular:      Rate and Rhythm: Normal rate and regular rhythm.      Pulses: Normal pulses.      Heart sounds: Normal heart sounds.   Pulmonary:      Effort: Pulmonary effort is normal.      Breath sounds: Normal breath sounds.   Abdominal:      Palpations: Abdomen is soft.   Musculoskeletal:      Cervical back: Normal range of motion.      Right lower leg: No edema.      Left lower leg: No edema.   Skin:     General: Skin is warm.      Capillary Refill: Capillary refill takes less than 2 seconds.   Neurological:      Mental Status: She is alert and oriented to person, place, and time.   Psychiatric:         Mood and Affect: Mood normal.         Behavior: Behavior normal.       Administrative Statements

## 2024-06-26 ENCOUNTER — TELEPHONE (OUTPATIENT)
Dept: FAMILY MEDICINE CLINIC | Facility: CLINIC | Age: 64
End: 2024-06-26

## 2024-06-26 NOTE — RESULT ENCOUNTER NOTE
Labs reviewed   Diabetic stable is normal   Blood counts stable   Kidney function looks great   Thyroid function is normal   CLEARED FOR SURGERY FAXING OVER PAPERS TO her doctor   Good luck

## 2024-07-10 ENCOUNTER — TELEPHONE (OUTPATIENT)
Dept: ADMINISTRATIVE | Facility: OTHER | Age: 64
End: 2024-07-10

## 2024-07-10 NOTE — TELEPHONE ENCOUNTER
----- Message from Ange SEWELL sent at 7/9/2024  4:32 PM EDT -----  Regarding: Hep C  07/09/24 4:32 PM    Hello, our patient attached above has had Hepatitis C completed/performed. Please assist in updating the patient chart by pulling the Care Everywhere (CE) document. The date of service is 4/15/2016.     Thank you,  Ange Mistry [facility]

## 2024-09-28 ENCOUNTER — OFFICE VISIT (OUTPATIENT)
Dept: OBGYN CLINIC | Facility: CLINIC | Age: 64
End: 2024-09-28
Payer: COMMERCIAL

## 2024-09-28 ENCOUNTER — APPOINTMENT (OUTPATIENT)
Dept: RADIOLOGY | Facility: CLINIC | Age: 64
End: 2024-09-28
Payer: COMMERCIAL

## 2024-09-28 VITALS
HEART RATE: 69 BPM | BODY MASS INDEX: 24.94 KG/M2 | TEMPERATURE: 98.2 F | RESPIRATION RATE: 18 BRPM | DIASTOLIC BLOOD PRESSURE: 63 MMHG | SYSTOLIC BLOOD PRESSURE: 131 MMHG | OXYGEN SATURATION: 99 % | WEIGHT: 127 LBS | HEIGHT: 60 IN

## 2024-09-28 DIAGNOSIS — M48.061 FORAMINAL STENOSIS OF LUMBAR REGION: Primary | ICD-10-CM

## 2024-09-28 DIAGNOSIS — M51.369 DEGENERATIVE DISC DISEASE, LUMBAR: ICD-10-CM

## 2024-09-28 DIAGNOSIS — M51.36 DEGENERATIVE DISC DISEASE, LUMBAR: ICD-10-CM

## 2024-09-28 DIAGNOSIS — M54.50 ACUTE LOW BACK PAIN, UNSPECIFIED BACK PAIN LATERALITY, UNSPECIFIED WHETHER SCIATICA PRESENT: ICD-10-CM

## 2024-09-28 PROCEDURE — 99214 OFFICE O/P EST MOD 30 MIN: CPT | Performed by: FAMILY MEDICINE

## 2024-09-28 PROCEDURE — 72100 X-RAY EXAM L-S SPINE 2/3 VWS: CPT

## 2024-09-28 RX ORDER — INDOMETHACIN 25 MG/1
CAPSULE ORAL
COMMUNITY
Start: 2024-09-10

## 2024-09-28 NOTE — PROGRESS NOTES
Assessment/Plan:    No problem-specific Assessment & Plan notes found for this encounter.       Diagnoses and all orders for this visit:    Foraminal stenosis of lumbar region  -     XR spine lumbar 2 or 3 views injury; Future  -     Ambulatory Referral to Physical Therapy; Future    Degenerative disc disease, lumbar  -     Ambulatory Referral to Physical Therapy; Future    Radiographs of the lumbar spine were obtained at today's office visit.  Greater than 45 minutes was spent reviewing patient's chart, medical history and history along with face-to-face discussion of management.  Radiographs revealed degenerative changes most prominent at the L4-L5 left foraminal level noted on the AP view.  Follow-up on official radiology report.  No acute fracture or dislocation was noted.  Clinical impression is that the patient is experiencing pain symptoms secondary to foraminal stenosis of the lumbar spine.  We discussed treatment options for foraminal stenosis and degenerative disc disease of lumbar spine.  Patient can continue with oral NSAID medication for pain relief and incorporate heating pad, ice, topical lidocaine patch and Voltaren gel.  Additionally am recommending physical therapy-referral placed.  Finally we discussed the role for interventional transforaminal injection however patient declines at today's visit.  We will revisit reevaluation in about 6 weeks after completion of physical therapy.  If symptoms are persisting she may benefit from a consultation with spine and pain for transforaminal injection            Subjective:      Patient ID: Albertina Alvarado is a 64 y.o. female presenting today for an initial evaluation of 2-month lower back pain.  Patient states that the pain had begun when she attempted to go from a sitting to standing position.  She states that the pain is experienced worse along the left side of her lower back.  Worsened with extension-based exercises.  She has tried the naproxen medication  which has provided mild relief.  She denies any bowel or bladder incontinence and does not have any referred pain or numbness or tingling in the lower extremities.    HPI    The following portions of the patient's history were reviewed and updated as appropriate: allergies, current medications, past family history, past medical history, past social history, past surgical history, and problem list.    Review of Systems      Objective:      /63   Pulse 69   Temp 98.2 °F (36.8 °C)   Resp 18   Ht 5' (1.524 m)   Wt 57.6 kg (127 lb)   SpO2 99%   BMI 24.80 kg/m²          Physical Exam  Constitutional:       General: She is not in acute distress.  Eyes:      Extraocular Movements: Extraocular movements intact.      Pupils: Pupils are equal, round, and reactive to light.   Neurological:      General: No focal deficit present.      Mental Status: She is alert and oriented to person, place, and time.      Gait: Gait is intact.      Deep Tendon Reflexes: Reflexes are normal and symmetric.             Spine Musculoskeletal Exam    Gait    Gait is normal.    Inspection    Thoracolumbar    Thoracolumbar inspection is normal.    Palpation    Thoracolumbar    Tenderness: present      Paraspinous: left      SI Joint: left    Range of Motion    Thoracolumbar    Range of motion is normal.         Strength    Thoracolumbar    Thoracolumbar motor exam is normal.       Sensory    Thoracolumbar    Thoracolumbar sensation is normal.    Reflexes    Thoracolumbar reflexes are normal.    General    Neurological: alert

## 2024-10-27 ENCOUNTER — HOSPITAL ENCOUNTER (EMERGENCY)
Facility: HOSPITAL | Age: 64
Discharge: HOME | End: 2024-10-27
Payer: COMMERCIAL

## 2024-10-27 VITALS
RESPIRATION RATE: 18 BRPM | TEMPERATURE: 97.7 F | OXYGEN SATURATION: 99 % | WEIGHT: 125 LBS | HEIGHT: 61 IN | DIASTOLIC BLOOD PRESSURE: 88 MMHG | BODY MASS INDEX: 23.6 KG/M2 | SYSTOLIC BLOOD PRESSURE: 167 MMHG | HEART RATE: 102 BPM

## 2024-10-27 DIAGNOSIS — S61.217A LACERATION OF LEFT LITTLE FINGER: Primary | ICD-10-CM

## 2024-10-27 DIAGNOSIS — R03.0 ELEVATED BLOOD PRESSURE READING: ICD-10-CM

## 2024-10-27 PROCEDURE — 99284 EMERGENCY DEPT VISIT MOD MDM: CPT

## 2024-10-27 PROCEDURE — 99282 EMERGENCY DEPT VISIT SF MDM: CPT

## 2024-10-27 NOTE — DISCHARGE INSTRUCTIONS
You were seen today in the emergency department for evaluation of finger laceration.  You had 2 Steri-Strips applied.  Keep this wound clean and dry as best you can.  Do not get it wet for 24 hours.  Ideally keep it dry for 1 week.  Return immediately for any new or worsening symptoms including spreading redness, worsening pain, fevers, chills, discharge.  Follow-up with your family medicine doctor in 1 week for further evaluation and repeat blood pressure check as your blood pressure was elevated in the emergency department today.

## 2024-10-27 NOTE — ED PROVIDER NOTES
Time reflects when diagnosis was documented in both MDM as applicable and the Disposition within this note       Time User Action Codes Description Comment    10/27/2024  6:16 PM Arsen Freed Add [S61.217A] Laceration of left little finger     10/27/2024  6:16 PM Arsen Freed Add [R03.0] Elevated blood pressure reading           ED Disposition       ED Disposition   Discharge    Condition   Stable    Date/Time   Sun Oct 27, 2024  6:16 PM    Comment   Albertina Alvarado discharge to home/self care.                   Assessment & Plan       Medical Decision Making  64-year-old female presenting for evaluation of finger laceration    Doubt dislocation, fracture.  Neurovascularly intact.  Tetanus up-to-date.  Irrigated by me using sterile saline.  Intact flexion at PIP, DIP of affected digit.  Bleeding controlled in emergency department, closed using 2 times Steri-Strip.  Wrapped in bandage.  Strict return precautions given to patient who verbalized understanding including not limited to spreading redness, worsening pain, fevers, chills, discharge.  Recommend follow-up with family medicine in 1 week for repeat blood pressure check.  Patient discharged in good condition.             Medications - No data to display    ED Risk Strat Scores                                               History of Present Illness       Chief Complaint   Patient presents with    Finger Laceration     Pt arrives c/o laceration to L 5th digit. Pt reports cutting fruit w/ knife when she accidentally cut finger. + baby aspirin. Unsure last tetanus        Past Medical History:   Diagnosis Date    Anxiety 09/2023    Depression 4 years    Headache(784.0) 24 years    Shingles       Past Surgical History:   Procedure Laterality Date    BREAST BIOPSY Right 01/01/2014    BREAST BIOPSY Left 01/01/2014    EYE SURGERY  2020    HAND SURGERY  2021    US GUIDED BREAST BIOPSY LEFT COMPLETE Left 8/18/2014      Family History   Problem Relation Age of  Onset    Heart disease Mother          2012    Hypertension Father          1988    Cancer Father          1988    No Known Problems Sister     No Known Problems Daughter     No Known Problems Maternal Grandmother     No Known Problems Paternal Grandmother     Depression Son     Asthma Son         Out-grew in his teens    No Known Problems Maternal Aunt     No Known Problems Paternal Aunt     No Known Problems Paternal Aunt     No Known Problems Paternal Aunt     Breast cancer Neg Hx       Social History     Tobacco Use    Smoking status: Former     Average packs/day: 0.5 packs/day for 30.0 years (15.0 ttl pk-yrs)     Types: Cigarettes     Start date: 2023     Quit date:      Years since quittin.8     Passive exposure: Never    Smokeless tobacco: Never   Vaping Use    Vaping status: Never Used   Substance Use Topics    Alcohol use: Never    Drug use: Never      E-Cigarette/Vaping    E-Cigarette Use Never User       E-Cigarette/Vaping Substances      I have reviewed and agree with the history as documented.     Patient is a 64-year-old female presenting to the emergency department for evaluation of finger laceration.  Patient notes she was cutting some root of a got approximately 5 hours prior to my evaluation when she cut her finger with a sharp knife.  She reports taking 81 mg aspirin daily and notes ongoing bleeding that was initially unresponsive to pressure.  She reports she is otherwise well, denying chest pain, shortness of breath, lightheadedness, numbness or tingling of the affected extremity.  Does endorse some mild pain to left fifth digit at site of laceration, denies neuro deficits.     Past Medical History:  2023: Anxiety  4 years: Depression  24 years: Headache(784.0)  No date: Shingles    Patient presents with:  Finger Laceration: Pt arrives c/o laceration to L 5th digit. Pt reports cutting fruit w/ knife when she accidentally cut finger. + baby aspirin.  Unsure last tetanus               Review of Systems   Constitutional:  Negative for chills and fever.   HENT:  Negative for ear pain and sore throat.    Eyes:  Negative for pain and visual disturbance.   Respiratory:  Negative for cough and shortness of breath.    Cardiovascular:  Negative for chest pain and palpitations.   Gastrointestinal:  Negative for abdominal pain and vomiting.   Genitourinary:  Negative for dysuria and hematuria.   Musculoskeletal:  Negative for arthralgias and back pain.   Skin:  Positive for wound. Negative for color change and rash.        Finger laceration     Neurological:  Negative for seizures and syncope.   All other systems reviewed and are negative.          Objective       ED Triage Vitals [10/27/24 1708]   Temperature Pulse Blood Pressure Respirations SpO2 Patient Position - Orthostatic VS   97.7 °F (36.5 °C) 102 167/88 18 99 % Sitting      Temp Source Heart Rate Source BP Location FiO2 (%) Pain Score    Temporal Monitor Left arm -- --      Vitals      Date and Time Temp Pulse SpO2 Resp BP Pain Score FACES Pain Rating User   10/27/24 1708 97.7 °F (36.5 °C) 102 99 % 18 167/88 -- -- RO            Physical Exam  Vitals and nursing note reviewed.   Constitutional:       General: She is not in acute distress.     Appearance: Normal appearance. She is not ill-appearing, toxic-appearing or diaphoretic.   HENT:      Head: Normocephalic and atraumatic.   Eyes:      General: No scleral icterus.        Right eye: No discharge.         Left eye: No discharge.      Extraocular Movements: Extraocular movements intact.      Conjunctiva/sclera: Conjunctivae normal.   Cardiovascular:      Rate and Rhythm: Normal rate.      Pulses: Normal pulses.      Heart sounds: Normal heart sounds. No murmur heard.     No friction rub. No gallop.   Pulmonary:      Effort: Pulmonary effort is normal. No respiratory distress.      Breath sounds: Normal breath sounds. No stridor. No wheezing, rhonchi or rales.    Abdominal:      General: Abdomen is flat. Bowel sounds are normal. There is no distension.      Palpations: Abdomen is soft.      Tenderness: There is no abdominal tenderness. There is no guarding or rebound.   Musculoskeletal:         General: No swelling. Normal range of motion.      Cervical back: Normal range of motion. No rigidity.      Right lower leg: No edema.      Left lower leg: No edema.      Comments: Flexion intact when isolating DIP/PIP. Normal sensation to affected digit.   Skin:     General: Skin is warm and dry.      Capillary Refill: Capillary refill takes less than 2 seconds.      Coloration: Skin is not jaundiced.      Findings: No bruising or lesion.      Comments: Small 0.5 cm L 5th digit laceration. Bleeding controlled.   Neurological:      General: No focal deficit present.      Mental Status: She is alert and oriented to person, place, and time. Mental status is at baseline.   Psychiatric:         Mood and Affect: Mood normal.         Behavior: Behavior normal.         Thought Content: Thought content normal.         Judgment: Judgment normal.         Results Reviewed       None            No orders to display       Laceration repair    Date/Time: 10/27/2024 6:15 PM    Performed by: Arsen Freed DO  Authorized by: Arsen Freed DO  Laceration length: 0.5 cm      Procedure Details:  Irrigation solution: saline  Irrigation method: syringe  Amount of cleaning: standard  Skin closure: Steri-Strips  Number of sutures: 2  Approximation: close  Approximation difficulty: simple  Patient tolerance: patient tolerated the procedure well with no immediate complications          ED Medication and Procedure Management   Prior to Admission Medications   Prescriptions Last Dose Informant Patient Reported? Taking?   Calcium Citrate-Vitamin D (CALCIUM CITRATE + D PO)  Self Yes No   Sig: Take by mouth   Melatonin 10 MG TABS  Self Yes No   Sig: Take by mouth   VITAMIN D,  CHOLECALCIFEROL, PO  Self Yes No   Sig: Take 25 mcg by mouth   aspirin (ECOTRIN LOW STRENGTH) 81 mg EC tablet  Self Yes No   Sig: Take 81 mg by mouth daily at bedtime   atorvastatin (LIPITOR) 10 mg tablet  Self Yes No   Sig: Take 10 mg by mouth daily at bedtime   brimonidine tartrate 0.2 % ophthalmic solution  Self Yes No   Sig: instill 1 drop into both eyes twice a day   indomethacin (INDOCIN) 25 mg capsule  Self Yes No   Sig: TAKE 1 CAPSULE (25 MG) BY MOUTH 2 TIMES A DAY AS NEEDED FOR PAIN.   latanoprost (XALATAN) 0.005 % ophthalmic solution  Self Yes No   Sig: Administer 1 drop to both eyes daily at bedtime   meclizine (ANTIVERT) 25 mg tablet  Self Yes No   Sig: Take 25 mg by mouth Three times daily as needed for dizziness   potassium chloride (KLOR-CON) 8 MEQ tablet  Self No No   Sig: Take 0.5 tablets (4 mEq total) by mouth daily at bedtime      Facility-Administered Medications: None     Discharge Medication List as of 10/27/2024  6:30 PM        CONTINUE these medications which have NOT CHANGED    Details   aspirin (ECOTRIN LOW STRENGTH) 81 mg EC tablet Take 81 mg by mouth daily at bedtime, Historical Med      atorvastatin (LIPITOR) 10 mg tablet Take 10 mg by mouth daily at bedtime, Starting Fri 1/26/2024, Historical Med      brimonidine tartrate 0.2 % ophthalmic solution instill 1 drop into both eyes twice a day, Historical Med      Calcium Citrate-Vitamin D (CALCIUM CITRATE + D PO) Take by mouth, Historical Med      indomethacin (INDOCIN) 25 mg capsule TAKE 1 CAPSULE (25 MG) BY MOUTH 2 TIMES A DAY AS NEEDED FOR PAIN., Historical Med      latanoprost (XALATAN) 0.005 % ophthalmic solution Administer 1 drop to both eyes daily at bedtime, Starting Wed 12/27/2023, Historical Med      meclizine (ANTIVERT) 25 mg tablet Take 25 mg by mouth Three times daily as needed for dizziness, Starting Thu 1/18/2024, Until Fri 1/17/2025 at 2359, Historical Med      Melatonin 10 MG TABS Take by mouth, Historical Med       potassium chloride (KLOR-CON) 8 MEQ tablet Take 0.5 tablets (4 mEq total) by mouth daily at bedtime, Starting Tue 3/5/2024, Fill Later      VITAMIN D, CHOLECALCIFEROL, PO Take 25 mcg by mouth, Historical Med           No discharge procedures on file.  ED SEPSIS DOCUMENTATION   Time reflects when diagnosis was documented in both MDM as applicable and the Disposition within this note       Time User Action Codes Description Comment    10/27/2024  6:16 PM Arsen Freed Add [S61.217A] Laceration of left little finger     10/27/2024  6:16 PM Arsen Freed Add [R03.0] Elevated blood pressure reading                  Arsen Freed DO  10/28/24 0034

## 2024-11-04 NOTE — PROGRESS NOTES
PT Evaluation     Today's date: 2024  Patient name: Albertina Alvarado  : 1960  MRN: [id number]  Referring provider: Andrés Lynn DO  Dx:   Encounter Diagnosis     ICD-10-CM    1. Foraminal stenosis of lumbar region  M48.061 Ambulatory Referral to Physical Therapy      2. Degeneration of intervertebral disc of lumbar region with discogenic back pain  M51.360 Ambulatory Referral to Physical Therapy          Start Time: 0800  Stop Time: 0840  Total time in clinic (min): 40 minutes    Assessment  Impairments: abnormal muscle tone, abnormal or restricted ROM, activity intolerance, impaired physical strength, lacks appropriate home exercise program and pain with function  Functional limitations: laying, sleeping, and household activities  Symptom irritability: moderate    Assessment details: Pt is a 64 y.o. female presenting to physical therapy with chief complaint of low back pain. Pt presents with scoliotic cruve of spine, with levoscoliosis in lumbar spine. As such, her L-sided musculature is weaker and her LLE is weaker than her RLE. Her main functional limitation is disturbed sleep and limited comfort with laying down. PT POC will focus on improving musculature symmetry and improving joint mobility in order to improve her functional ability. Pt is a good candidate for skilled PT to address impairments and facilitate return to prior level of function.    Goals  STG 4 - weeks  1. Patient will demonstrate improved lumbar extension ROM by 3 degrees or better to facilitate functional ability.  2. Patient will demonstrate improved L hip abduction strength by one level or better to facilitate functional ability.  LTG 8 - weeks  1. Patient will demonstrate decreased pain at worst with sleeping to 4/10 or better to facilitate functional ability.  2. Patient will demonstrate increased FOTO score by 10 points or more from baseline.    Plan  Patient would benefit from: PT eval and skilled physical therapy    Planned  therapy interventions: abdominal trunk stabilization, home exercise program, therapeutic exercise, therapeutic activities, stretching, strengthening, postural training, patient/caregiver education, neuromuscular re-education, IASTM, joint mobilization, kinesiology taping, manual therapy, functional ROM exercises and body mechanics training    Frequency: 1-2x week  Duration in weeks: 8  Plan of Care beginning date: 2024  Plan of Care expiration date: 2024  Treatment plan discussed with: patient        Subjective Evaluation    History of Present Illness  Mechanism of injury: Pt reports worsening of lower back pain for two months. She had some pain off/on chronically but not this bad. Lately it has been bad at night and has been keeping her up. She has tried the naproxen medication which has provided mild relief. X-rays showed degenerative changes most pronounced at L4-5 where there is eccentric disc space narrowing and endplate osteophytes. Pt has slight S curve with L lumbar convex curve. 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 She feels very stiff in the morning. Pt sleeps on her side but has difficulty getting comfortable. Pain not worsened too much with standing, walking, etc. Pain gets better as the day goes along. No numbness, tingling, or pain down the leg. However she reports she used to have sciatica unassociated with her low back pain now, this hasn't affected her in about 4 months.   Patient Goals  Patient goals for therapy: decreased pain and return to sport/leisure activities    Pain  Current pain ratin  At best pain ratin  At worst pain ratin  Location: L lumbar  Quality: throbbing  Relieving factors: medications  Exacerbated by: Laying.  Progression: no change        Objective     Postural Observations  Seated posture: good  Standing posture: good      Tenderness     Lumbar Spine  No tenderness in the spinous process, facet  joint, left transverse process and right transverse process.     Active Range of Motion     Lumbar   Flexion: Active lumbar flexion: To toes.  Restriction level: moderate  Extension: 6 degrees  Restriction level: moderate  Left lateral flexion: Active left lumbar lateral flexion: To knee.    Restriction level: moderate  Right lateral flexion: Active right lumbar lateral flexion: To knee.  Restriction level: moderate    Joint Play     Hypomobile: L4 and L5   Mechanical Assessment    Cervical      Thoracic      Lumbar    Standing flexion:    Pain intensity: worse  Standing extension:   Pain intensity: worse    Strength/Myotome Testing     Left Hip   Planes of Motion   Flexion: 4  Extension: 4-  Abduction: 4-  External rotation: 4  Internal rotation: 4    Right Hip   Planes of Motion   Flexion: 4+  Extension: 4+  Abduction: 4+  External rotation: 4+  Internal rotation: 4+    Left Knee   Flexion: 4-  Extension: 4    Right Knee   Flexion: 4-  Extension: 4             Precautions: N/A    POC expires Unit limit Auth Expiration date PT/OT + Visit Limit?   12/31   [facility]                 Visit/Unit Tracking  AUTH Status:  Date                Used                Remaining                        Manuals 11/5                                                                Neuro Re-Ed             Pt education on pathology, scoliosis, POC, HEP 15'            S/L L hip abd                                                                              Ther Ex             S/L stretch c bolster (R side up) 2'                                                                                                       Ther Activity                                       Gait Training                                       Modalities

## 2024-11-05 ENCOUNTER — EVALUATION (OUTPATIENT)
Dept: PHYSICAL THERAPY | Facility: CLINIC | Age: 64
End: 2024-11-05
Payer: COMMERCIAL

## 2024-11-05 DIAGNOSIS — M48.061 FORAMINAL STENOSIS OF LUMBAR REGION: Primary | ICD-10-CM

## 2024-11-05 DIAGNOSIS — M51.360 DEGENERATION OF INTERVERTEBRAL DISC OF LUMBAR REGION WITH DISCOGENIC BACK PAIN: ICD-10-CM

## 2024-11-05 PROCEDURE — 97112 NEUROMUSCULAR REEDUCATION: CPT

## 2024-11-05 PROCEDURE — 97161 PT EVAL LOW COMPLEX 20 MIN: CPT

## 2024-11-13 ENCOUNTER — OFFICE VISIT (OUTPATIENT)
Dept: FAMILY MEDICINE CLINIC | Facility: CLINIC | Age: 64
End: 2024-11-13
Payer: COMMERCIAL

## 2024-11-13 VITALS
SYSTOLIC BLOOD PRESSURE: 130 MMHG | BODY MASS INDEX: 24.35 KG/M2 | HEART RATE: 64 BPM | DIASTOLIC BLOOD PRESSURE: 78 MMHG | TEMPERATURE: 98.4 F | HEIGHT: 61 IN | WEIGHT: 129 LBS | OXYGEN SATURATION: 98 %

## 2024-11-13 DIAGNOSIS — Z23 ENCOUNTER FOR IMMUNIZATION: ICD-10-CM

## 2024-11-13 DIAGNOSIS — R73.03 PREDIABETES: ICD-10-CM

## 2024-11-13 DIAGNOSIS — Z13.220 LIPID SCREENING: ICD-10-CM

## 2024-11-13 DIAGNOSIS — R42 DIZZINESS: ICD-10-CM

## 2024-11-13 DIAGNOSIS — Z00.00 ANNUAL PHYSICAL EXAM: Primary | ICD-10-CM

## 2024-11-13 DIAGNOSIS — Z12.31 ENCOUNTER FOR SCREENING MAMMOGRAM FOR MALIGNANT NEOPLASM OF BREAST: ICD-10-CM

## 2024-11-13 DIAGNOSIS — Z13.29 THYROID DISORDER SCREEN: ICD-10-CM

## 2024-11-13 PROCEDURE — 99397 PER PM REEVAL EST PAT 65+ YR: CPT | Performed by: STUDENT IN AN ORGANIZED HEALTH CARE EDUCATION/TRAINING PROGRAM

## 2024-11-13 PROCEDURE — 90471 IMMUNIZATION ADMIN: CPT

## 2024-11-13 PROCEDURE — 90662 IIV NO PRSV INCREASED AG IM: CPT

## 2024-11-13 RX ORDER — MECLIZINE HYDROCHLORIDE 25 MG/1
25 TABLET ORAL EVERY 12 HOURS PRN
Qty: 30 TABLET | Refills: 0 | Status: SHIPPED | OUTPATIENT
Start: 2024-11-13 | End: 2025-11-13

## 2024-11-13 NOTE — PATIENT INSTRUCTIONS
"Patient Education     Routine physical for adults   The Basics   Written by the doctors and editors at [facility]   What is a physical? -- A physical is a routine visit, or \"check-up,\" with your doctor. You might also hear it called a \"wellness visit\" or \"preventive visit.\"  During each visit, the doctor will:   Ask about your physical and mental health   Ask about your habits, behaviors, and lifestyle   Do an exam   Give you vaccines if needed   Talk to you about any medicines you take   Give advice about your health   Answer your questions  Getting regular check-ups is an important part of taking care of your health. It can help your doctor find and treat any problems you have. But it's also important for preventing health problems.  A routine physical is different from a \"sick visit.\" A sick visit is when you see a doctor because of a health concern or problem. Since physicals are scheduled ahead of time, you can think about what you want to ask the doctor.  How often should I get a physical? -- It depends on your age and health. In general, for people age 21 years and older:   If you are younger than 50 years, you might be able to get a physical every 3 years.   If you are 50 years or older, your doctor might recommend a physical every year.  If you have an ongoing health condition, like diabetes or high blood pressure, your doctor will probably want to see you more often.  What happens during a physical? -- In general, each visit will include:   Physical exam - The doctor or nurse will check your height, weight, heart rate, and blood pressure. They will also look at your eyes and ears. They will ask about how you are feeling and whether you have any symptoms that bother you.   Medicines - It's a good idea to bring a list of all the medicines you take to each doctor visit. Your doctor will talk to you about your medicines and answer any questions. Tell them if you are having any side effects that bother you. You " "should also tell them if you are having trouble paying for any of your medicines.   Habits and behaviors - This includes:   Your diet   Your exercise habits   Whether you smoke, drink alcohol, or use drugs   Whether you are sexually active   Whether you feel safe at home  Your doctor will talk to you about things you can do to improve your health and lower your risk of health problems. They will also offer help and support. For example, if you want to quit smoking, they can give you advice and might prescribe medicines. If you want to improve your diet or get more physical activity, they can help you with this, too.   Lab tests, if needed - The tests you get will depend on your age and situation. For example, your doctor might want to check your:   Cholesterol   Blood sugar   Iron level   Vaccines - The recommended vaccines will depend on your age, health, and what vaccines you already had. Vaccines are very important because they can prevent certain serious or deadly infections.   Discussion of screening - \"Screening\" means checking for diseases or other health problems before they cause symptoms. Your doctor can recommend screening based on your age, risk, and preferences. This might include tests to check for:   Cancer, such as breast, prostate, cervical, ovarian, colorectal, prostate, lung, or skin cancer   Sexually transmitted infections, such as chlamydia and gonorrhea   Mental health conditions like depression and anxiety  Your doctor will talk to you about the different types of screening tests. They can help you decide which screenings to have. They can also explain what the results might mean.   Answering questions - The physical is a good time to ask the doctor or nurse questions about your health. If needed, they can refer you to other doctors or specialists, too.  Adults older than 65 years often need other care, too. As you get older, your doctor will talk to you about:   How to prevent falling at " home   Hearing or vision tests   Memory testing   How to take your medicines safely   Making sure that you have the help and support you need at home  All topics are updated as new evidence becomes available and our peer review process is complete.  This topic retrieved from [employer] on: May 02, 2024.  Topic 045225 Version 1.0  Release: 32.4.3 - C32.122  © 2024 UpToDate, Inc. and/or its affiliates. All rights reserved.  Consumer Information Use and Disclaimer   Disclaimer: This generalized information is a limited summary of diagnosis, treatment, and/or medication information. It is not meant to be comprehensive and should be used as a tool to help the user understand and/or assess potential diagnostic and treatment options. It does NOT include all information about conditions, treatments, medications, side effects, or risks that may apply to a specific patient. It is not intended to be medical advice or a substitute for the medical advice, diagnosis, or treatment of a health care provider based on the health care provider's examination and assessment of a patient's specific and unique circumstances. Patients must speak with a health care provider for complete information about their health, medical questions, and treatment options, including any risks or benefits regarding use of medications. This information does not endorse any treatments or medications as safe, effective, or approved for treating a specific patient. UpToDate, Inc. and its affiliates disclaim any warranty or liability relating to this information or the use thereof.The use of this information is governed by the Terms of Use, available at https://www.woltersDuneNetworksuwer.com/en/know/clinical-effectiveness-terms. 2024© UpToDate, Inc. and its affiliates and/or licensors. All rights reserved.  Copyright   © 2024 UpToDate, Inc. and/or its affiliates. All rights reserved.

## 2024-11-13 NOTE — ASSESSMENT & PLAN NOTE
Annual physical exam completed, breast cancer screening and colorectal cancer screening both up-to-date.  Routine blood work has been ordered and I will follow-up pending results.  Flu vaccine administered today.

## 2024-11-13 NOTE — PROGRESS NOTES
Adult Annual Physical  Name: Albertina Alvarado      : 1960      MRN: [id number]  Encounter Provider: Cyndy Carrasco DO  Encounter Date: 2024   Encounter department: [facility]    Assessment & Plan  Annual physical exam  Annual physical exam completed, breast cancer screening and colorectal cancer screening both up-to-date.  Routine blood work has been ordered and I will follow-up pending results.  Flu vaccine administered today.       Encounter for screening mammogram for malignant neoplasm of breast    Orders:    Mammo screening bilateral w 3d and cad; Future    Prediabetes  Most recent A1c 5.7, patient does endorse local bread.  Patient has also had 9 pound weight gain since last visit.  Repeat A1c pending, I will follow-up pending results.  Orders:    Hemoglobin A1C; Future    Comprehensive metabolic panel; Future    CBC and Platelet; Future    Thyroid disorder screen    Orders:    TSH, 3rd generation with Free T4 reflex; Future    Lipid screening    Orders:    Lipid panel; Future    Dizziness    Orders:    meclizine (ANTIVERT) 25 mg tablet; Take 1 tablet (25 mg total) by mouth every 12 (twelve) hours as needed for dizziness      Immunizations and preventive care screenings were discussed with patient today. Appropriate education was printed on patient's after visit summary.    Counseling:  Alcohol/drug use: discussed moderation in alcohol intake, the recommendations for healthy alcohol use, and avoidance of illicit drug use.  Dental Health: discussed importance of regular tooth brushing, flossing, and dental visits.  Exercise: the importance of regular exercise/physical activity was discussed. Recommend exercise 3-5 times per week for at least 30 minutes.          History of Present Illness     Adult Annual Physical:  Patient presents for annual physical.     Diet and Physical Activity:  - Diet/Nutrition: well balanced diet.  - Exercise: no formal  "exercise.    Depression Screening:  - PHQ-2 Score: 0    General Health:  - Sleep: sleeps well.  - Hearing: normal hearing right ear.  - Vision: goes for regular eye exams.  - Dental: regular dental visits.    /GYN Health:  - Follows with GYN: no.   - Menopause: postmenopausal.     Review of Systems   Constitutional:  Negative for chills and fever.   HENT:  Negative for congestion and rhinorrhea.    Respiratory:  Negative for cough and shortness of breath.    Cardiovascular:  Negative for chest pain and palpitations.   Gastrointestinal:  Negative for abdominal pain, constipation and diarrhea.   Neurological:  Negative for dizziness and headaches.         Objective     /80 (BP Location: Left arm, Patient Position: Sitting, Cuff Size: Adult)   Temp 98.4 °F (36.9 °C) (Temporal)   Resp (!) 64   Ht 5' 3\" (1.6 m)   Wt 58.5 kg (129 lb)   SpO2 98%   BMI 22.85 kg/m²     Physical Exam  Vitals reviewed.   Constitutional:       Appearance: Normal appearance.   HENT:      Head: Normocephalic and atraumatic.      Nose: Nose normal. No congestion or rhinorrhea.      Mouth/Throat:      Mouth: Mucous membranes are moist.      Pharynx: No oropharyngeal exudate or posterior oropharyngeal erythema.   Eyes:      Extraocular Movements: Extraocular movements intact.   Cardiovascular:      Rate and Rhythm: Normal rate and regular rhythm.      Heart sounds: Normal heart sounds.   Pulmonary:      Breath sounds: Normal breath sounds.   Musculoskeletal:      Cervical back: Neck supple. No tenderness.      Right lower leg: No edema.      Left lower leg: No edema.   Lymphadenopathy:      Cervical: No cervical adenopathy.   Neurological:      General: No focal deficit present.      Mental Status: She is alert and oriented to person, place, and time.         "

## 2024-11-13 NOTE — ASSESSMENT & PLAN NOTE
Most recent A1c 5.7, patient does endorse local bread.  Patient has also had 9 pound weight gain since last visit.  Repeat A1c pending, I will follow-up pending results.  Orders:    Hemoglobin A1C; Future    Comprehensive metabolic panel; Future    CBC and Platelet; Future

## 2024-11-14 ENCOUNTER — OFFICE VISIT (OUTPATIENT)
Dept: PHYSICAL THERAPY | Facility: CLINIC | Age: 64
End: 2024-11-14
Payer: COMMERCIAL

## 2024-11-14 DIAGNOSIS — M51.360 DEGENERATION OF INTERVERTEBRAL DISC OF LUMBAR REGION WITH DISCOGENIC BACK PAIN: ICD-10-CM

## 2024-11-14 DIAGNOSIS — M48.061 FORAMINAL STENOSIS OF LUMBAR REGION: Primary | ICD-10-CM

## 2024-11-14 PROCEDURE — 97110 THERAPEUTIC EXERCISES: CPT

## 2024-11-14 PROCEDURE — 97112 NEUROMUSCULAR REEDUCATION: CPT

## 2024-11-14 NOTE — PROGRESS NOTES
"Daily Note     Today's date: 2024  Patient name: Albertina Alvarado  : 1960  MRN: [id number]  Referring provider: Andrés Lynn DO  Dx:   Encounter Diagnosis     ICD-10-CM    1. Foraminal stenosis of lumbar region  M48.061       2. Degeneration of intervertebral disc of lumbar region with discogenic back pain  M51.360           Start Time: 1800  Stop Time: 1840  Total time in clinic (min): 40 minutes    Subjective: Pt reports pain has been bad on some nights.      Objective: See treatment diary below      Assessment: Tolerated treatment well. Patient demonstrated fatigue post treatment, exhibited good technique with therapeutic exercises, and would benefit from continued PT. Progressed plan of care today to focus on lumbar ROM and core stabilization. Pt was adequately challenged by progressions made this session and was fatigued. Good technique with exercises.      Plan: Continue per plan of care.  Progress treatment as tolerated.       Precautions: N/A    POC expires Unit limit Auth Expiration date PT/OT + Visit Limit?      BOMN                 Visit/Unit Tracking  AUTH Status:  Date                Used                Remaining                        Manuals  FOTO IE                                                               Neuro Re-Ed             Pt education on pathology, scoliosis, POC, HEP 15' 10'           S/L L hip abd  1x10           PPT  1x10 5\"           PPT c L SLR  1x10           Bridges  1x10 5\"           DB pullover             Paloff press                                       Ther Ex             S/L stretch c bolster (R side up) 2'            LTR  1x10 5\"                                                                                         Ther Activity                                       Gait Training                                       Modalities                                            "

## 2024-11-21 ENCOUNTER — APPOINTMENT (OUTPATIENT)
Dept: PHYSICAL THERAPY | Facility: CLINIC | Age: 64
End: 2024-11-21
Payer: COMMERCIAL

## 2025-02-07 ENCOUNTER — TELEPHONE (OUTPATIENT)
Age: 65
End: 2025-02-07

## 2025-02-07 NOTE — TELEPHONE ENCOUNTER
Patient is having sinus issues and headaches for the past 3 days. Patient has been rinsing with linette pott every 4 hours. Using Sinex, has taken Aleve,Tylenol, sudafed and no relief. Want to know what is advised? Thank you!      Albertina: [phone]

## 2025-02-10 ENCOUNTER — OFFICE VISIT (OUTPATIENT)
Dept: FAMILY MEDICINE CLINIC | Facility: CLINIC | Age: 65
End: 2025-02-10
Payer: MEDICARE

## 2025-02-10 VITALS
TEMPERATURE: 98.2 F | OXYGEN SATURATION: 95 % | BODY MASS INDEX: 24.99 KG/M2 | DIASTOLIC BLOOD PRESSURE: 74 MMHG | HEIGHT: 61 IN | HEART RATE: 92 BPM | SYSTOLIC BLOOD PRESSURE: 130 MMHG | WEIGHT: 132.38 LBS

## 2025-02-10 DIAGNOSIS — R68.89 FLU-LIKE SYMPTOMS: ICD-10-CM

## 2025-02-10 DIAGNOSIS — Z12.31 ENCOUNTER FOR SCREENING MAMMOGRAM FOR MALIGNANT NEOPLASM OF BREAST: ICD-10-CM

## 2025-02-10 DIAGNOSIS — J02.0 STREP THROAT: Primary | ICD-10-CM

## 2025-02-10 LAB
S PYO AG THROAT QL: POSITIVE
SL AMB POCT RAPID FLU A: NEGATIVE
SL AMB POCT RAPID FLU B: NEGATIVE

## 2025-02-10 PROCEDURE — G2211 COMPLEX E/M VISIT ADD ON: HCPCS | Performed by: STUDENT IN AN ORGANIZED HEALTH CARE EDUCATION/TRAINING PROGRAM

## 2025-02-10 PROCEDURE — 87880 STREP A ASSAY W/OPTIC: CPT | Performed by: STUDENT IN AN ORGANIZED HEALTH CARE EDUCATION/TRAINING PROGRAM

## 2025-02-10 PROCEDURE — 99213 OFFICE O/P EST LOW 20 MIN: CPT | Performed by: STUDENT IN AN ORGANIZED HEALTH CARE EDUCATION/TRAINING PROGRAM

## 2025-02-10 PROCEDURE — 87804 INFLUENZA ASSAY W/OPTIC: CPT | Performed by: STUDENT IN AN ORGANIZED HEALTH CARE EDUCATION/TRAINING PROGRAM

## 2025-02-10 NOTE — ASSESSMENT & PLAN NOTE
Rapid strep positive, rapid flu negative.  Will treat with Augmentin twice daily x 7 days.  Instructed patient to change toothbrush after 24 hours.  Precautions given.  Orders:  •  amoxicillin-clavulanate (AUGMENTIN) 875-125 mg per tablet; Take 1 tablet by mouth every 12 (twelve) hours for 7 days

## 2025-02-10 NOTE — PROGRESS NOTES
"Name: Albertina Alvarado      : 1960      MRN: [id number]  Encounter Provider: Cyndy Carrasco DO  Encounter Date: 2/10/2025   Encounter department: [facility]  :  Assessment & Plan  Strep throat  Rapid strep positive, rapid flu negative.  Will treat with Augmentin twice daily x 7 days.  Instructed patient to change toothbrush after 24 hours.  Precautions given.  Orders:  •  amoxicillin-clavulanate (AUGMENTIN) 875-125 mg per tablet; Take 1 tablet by mouth every 12 (twelve) hours for 7 days    Encounter for screening mammogram for malignant neoplasm of breast    Orders:  •  Mammo screening bilateral w 3d and cad; Future           History of Present Illness   Sinus Problem  This is a new problem. The current episode started in the past 7 days. The problem is unchanged. There has been no fever. The pain is mild. Associated symptoms include congestion and headaches. Pertinent negatives include no coughing, ear pain, sinus pressure, sneezing, sore throat or swollen glands. Past treatments include nothing.   URI   This is a new problem. The current episode started in the past 7 days. The problem has been gradually worsening. There has been no fever. Associated symptoms include congestion, headaches, rhinorrhea and sinus pain. Pertinent negatives include no coughing, ear pain, sneezing, sore throat or swollen glands. She has tried nothing for the symptoms. The treatment provided mild relief.     Review of Systems   HENT:  Positive for congestion, rhinorrhea and sinus pain. Negative for ear pain, sinus pressure, sneezing and sore throat.    Respiratory:  Negative for cough.    Neurological:  Positive for headaches.       Objective   /74 (BP Location: Left arm, Patient Position: Sitting, Cuff Size: Adult)   Pulse 92   Temp 98.2 °F (36.8 °C) (Temporal)   Ht 5' 1.3\" (1.557 m)   Wt 60 kg (132 lb 6 oz)   SpO2 95%   BMI 24.77 kg/m²      Physical Exam  Vitals reviewed. "   Constitutional:       Appearance: She is ill-appearing.   HENT:      Head: Normocephalic and atraumatic.      Mouth/Throat:      Mouth: Mucous membranes are moist.      Pharynx: Posterior oropharyngeal erythema present. No oropharyngeal exudate.   Eyes:      Extraocular Movements: Extraocular movements intact.   Cardiovascular:      Rate and Rhythm: Regular rhythm.      Heart sounds: Normal heart sounds.   Pulmonary:      Effort: Pulmonary effort is normal.      Breath sounds: Normal breath sounds.   Musculoskeletal:      Cervical back: Neck supple.   Neurological:      General: No focal deficit present.      Mental Status: She is alert and oriented to person, place, and time.   Psychiatric:         Mood and Affect: Mood normal.         Behavior: Behavior normal.

## 2025-03-13 ENCOUNTER — OFFICE VISIT (OUTPATIENT)
Dept: URGENT CARE | Facility: CLINIC | Age: 65
End: 2025-03-13
Payer: MEDICARE

## 2025-03-13 VITALS
OXYGEN SATURATION: 97 % | HEART RATE: 88 BPM | WEIGHT: 132 LBS | TEMPERATURE: 98.7 F | DIASTOLIC BLOOD PRESSURE: 92 MMHG | BODY MASS INDEX: 24.7 KG/M2 | SYSTOLIC BLOOD PRESSURE: 136 MMHG | RESPIRATION RATE: 18 BRPM

## 2025-03-13 DIAGNOSIS — R68.89 FLU-LIKE SYMPTOMS: ICD-10-CM

## 2025-03-13 DIAGNOSIS — J06.9 ACUTE URI: Primary | ICD-10-CM

## 2025-03-13 DIAGNOSIS — J06.9 ACUTE URI: ICD-10-CM

## 2025-03-13 PROCEDURE — 99213 OFFICE O/P EST LOW 20 MIN: CPT | Performed by: PHYSICIAN ASSISTANT

## 2025-03-13 PROCEDURE — S9083 URGENT CARE CENTER GLOBAL: HCPCS | Performed by: PHYSICIAN ASSISTANT

## 2025-03-13 PROCEDURE — G0383 LEV 4 HOSP TYPE B ED VISIT: HCPCS | Performed by: PHYSICIAN ASSISTANT

## 2025-03-13 PROCEDURE — G0463 HOSPITAL OUTPT CLINIC VISIT: HCPCS | Performed by: PHYSICIAN ASSISTANT

## 2025-03-13 PROCEDURE — 87636 SARSCOV2 & INF A&B AMP PRB: CPT | Performed by: PHYSICIAN ASSISTANT

## 2025-03-13 RX ORDER — OSELTAMIVIR PHOSPHATE 75 MG/1
75 CAPSULE ORAL 2 TIMES DAILY
Qty: 10 CAPSULE | Refills: 0 | Status: SHIPPED | OUTPATIENT
Start: 2025-03-13 | End: 2025-03-18

## 2025-03-13 RX ORDER — OSELTAMIVIR PHOSPHATE 75 MG/1
75 CAPSULE ORAL 2 TIMES DAILY
Qty: 10 CAPSULE | Refills: 0 | Status: SHIPPED | OUTPATIENT
Start: 2025-03-13 | End: 2025-03-13

## 2025-03-13 NOTE — PROGRESS NOTES
[facility]        NAME: Albertina Alvarado is a 65 y.o. female  : 1960    MRN: [id number]  DATE: 2025  TIME: 6:04 PM      Assessment and Plan     Acute URI [J06.9]  1. Acute URI  Covid/Flu- Office Collect Normal      2. Flu-like symptoms  oseltamivir (TAMIFLU) 75 mg capsule    DISCONTINUED: oseltamivir (TAMIFLU) 75 mg capsule        Medical Decision Making Note:   Due to possible flu, will start patient on Tamiflu and send covid/flu PCR swab   Patient can discontinue Tamiflu if negative for the flu   Continue OTC medications as needed for symptoms     Patient Instructions   There are no Patient Instructions on file for this visit.     Follow up with primary care provider.   Go to ER if symptoms worsen.    Chief Complaint     Chief Complaint   Patient presents with    Headache     Pt here for headache dizzy diarrhea that started this morning and feels heaviness in chest and has taken robutussin          History of Present Illness     Patient presents with chest tightness, sneezing, headache, dizziness x 2 episodes, and diarrhea x this morning. She took OTC robitussin with mild relief.         Review of Systems     Review of Systems   Constitutional:  Negative for chills, fatigue and fever.   HENT:  Positive for sneezing. Negative for congestion, ear pain, postnasal drip, rhinorrhea, sinus pressure, sinus pain and sore throat.    Eyes:  Negative for pain and visual disturbance.   Respiratory:  Positive for chest tightness. Negative for cough and shortness of breath.    Cardiovascular:  Negative for chest pain and palpitations.   Gastrointestinal:  Positive for diarrhea. Negative for abdominal pain, nausea and vomiting.   Genitourinary:  Negative for dysuria and hematuria.   Musculoskeletal:  Negative for arthralgias, back pain and myalgias.   Skin:  Negative for rash.   Neurological:  Positive for dizziness and headaches. Negative for seizures, syncope and numbness.   All other systems reviewed  and are negative.        Current Medications       Current Outpatient Medications:     aspirin (ECOTRIN LOW STRENGTH) 81 mg EC tablet, Take 81 mg by mouth daily at bedtime, Disp: , Rfl:     atorvastatin (LIPITOR) 10 mg tablet, Take 10 mg by mouth daily at bedtime, Disp: , Rfl:     brimonidine tartrate 0.2 % ophthalmic solution, instill 1 drop into both eyes twice a day, Disp: , Rfl:     Calcium Citrate-Vitamin D (CALCIUM CITRATE + D PO), Take by mouth, Disp: , Rfl:     indomethacin (INDOCIN) 25 mg capsule, TAKE 1 CAPSULE (25 MG) BY MOUTH 2 TIMES A DAY AS NEEDED FOR PAIN., Disp: , Rfl:     latanoprost (XALATAN) 0.005 % ophthalmic solution, Administer 1 drop to both eyes daily at bedtime, Disp: , Rfl:     meclizine (ANTIVERT) 25 mg tablet, Take 1 tablet (25 mg total) by mouth every 12 (twelve) hours as needed for dizziness, Disp: 30 tablet, Rfl: 0    Melatonin 10 MG TABS, Take by mouth, Disp: , Rfl:     oseltamivir (TAMIFLU) 75 mg capsule, Take 1 capsule (75 mg total) by mouth 2 (two) times a day for 5 days, Disp: 10 capsule, Rfl: 0    potassium chloride (KLOR-CON) 8 MEQ tablet, Take 0.5 tablets (4 mEq total) by mouth daily at bedtime, Disp: 90 tablet, Rfl: 0    VITAMIN D, CHOLECALCIFEROL, PO, Take 25 mcg by mouth, Disp: , Rfl:     Current Allergies     Allergies as of 03/13/2025    (No Known Allergies)              The following portions of the patient's history were reviewed and updated as appropriate: allergies, current medications, past family history, past medical history, past social history, past surgical history, and problem list.     Past Medical History:   Diagnosis Date    Anxiety 09/2023    Depression 4 years    Headache(784.0) 24 years    Shingles        Past Surgical History:   Procedure Laterality Date    BREAST BIOPSY Right 01/01/2014    BREAST BIOPSY Left 01/01/2014    EYE SURGERY  2020    HAND SURGERY  2021    US GUIDED BREAST BIOPSY LEFT COMPLETE Left 8/18/2014       Family History   Problem  Relation Age of Onset    Heart disease Mother          2012    Hypertension Father          1988    Cancer Father          1988    No Known Problems Sister     No Known Problems Daughter     No Known Problems Maternal Grandmother     No Known Problems Paternal Grandmother     Depression Son     Asthma Son         Out-grew in his teens    No Known Problems Maternal Aunt     No Known Problems Paternal Aunt     No Known Problems Paternal Aunt     No Known Problems Paternal Aunt     Breast cancer Neg Hx          Medications have been verified.        Objective     /92   Pulse 88   Temp 98.7 °F (37.1 °C) (Tympanic)   Resp 18   Wt 59.9 kg (132 lb)   SpO2 97%   BMI 24.70 kg/m²   No LMP recorded. Patient is postmenopausal.         Physical Exam     Physical Exam  Vitals and nursing note reviewed.   Constitutional:       Appearance: Normal appearance. She is normal weight.   HENT:      Head: Normocephalic and atraumatic.      Right Ear: Tympanic membrane, ear canal and external ear normal.      Left Ear: Tympanic membrane, ear canal and external ear normal.      Nose: Nose normal.      Mouth/Throat:      Mouth: Mucous membranes are moist.      Pharynx: Oropharynx is clear.   Cardiovascular:      Rate and Rhythm: Normal rate and regular rhythm.      Heart sounds: Normal heart sounds.   Pulmonary:      Effort: Pulmonary effort is normal.      Breath sounds: Normal breath sounds.   Skin:     General: Skin is warm and dry.   Neurological:      General: No focal deficit present.      Mental Status: She is alert and oriented to person, place, and time.   Psychiatric:         Mood and Affect: Mood normal.         Behavior: Behavior normal.

## 2025-03-14 ENCOUNTER — TELEPHONE (OUTPATIENT)
Age: 65
End: 2025-03-14

## 2025-03-14 LAB
FLUAV RNA RESP QL NAA+PROBE: NEGATIVE
FLUBV RNA RESP QL NAA+PROBE: NEGATIVE
SARS-COV-2 RNA RESP QL NAA+PROBE: NEGATIVE

## 2025-03-14 NOTE — TELEPHONE ENCOUNTER
"Patient called to state that she received her   covid/flu PCR swab test result back as \"negative\". Patient was seen in the urgent care yesterday 3/13. From urgent care note:         Patient states she will stop the tamiflu. States that today she doesn't have any diarrhea or dizziness. Just a runny nose and pressure headache with sinus pressure. Patient took tylenol 3 hrs ago. Patient was advice to doing a nasal wash with Neti pot to help and to maintain hydration and rest. Denies any fever or shortness of breath.  Patient was advise to call back if she has further questions or starts with new or worst symptoms.   "

## 2025-03-24 ENCOUNTER — TELEPHONE (OUTPATIENT)
Dept: FAMILY MEDICINE CLINIC | Facility: CLINIC | Age: 65
End: 2025-03-24

## 2025-03-24 ENCOUNTER — TELEPHONE (OUTPATIENT)
Age: 65
End: 2025-03-24

## 2025-03-24 NOTE — TELEPHONE ENCOUNTER
Pt called back, scheduled with pcp. Was checking on her medical records request that [employer] sent over on 3/11. Made aware request was sent to [employer] on 3/12. She is checking with them to see the status of this.

## 2025-03-24 NOTE — TELEPHONE ENCOUNTER
Left message for patient to call the office to schedule an appointment with PCP in regards to cholesterol questions.

## 2025-04-15 ENCOUNTER — OFFICE VISIT (OUTPATIENT)
Dept: FAMILY MEDICINE CLINIC | Facility: CLINIC | Age: 65
End: 2025-04-15
Payer: MEDICARE

## 2025-04-15 VITALS
HEIGHT: 61 IN | SYSTOLIC BLOOD PRESSURE: 124 MMHG | BODY MASS INDEX: 24.07 KG/M2 | TEMPERATURE: 98 F | OXYGEN SATURATION: 98 % | WEIGHT: 127.5 LBS | HEART RATE: 71 BPM | DIASTOLIC BLOOD PRESSURE: 78 MMHG

## 2025-04-15 DIAGNOSIS — E78.01 FAMILIAL HYPERCHOLESTEROLEMIA: ICD-10-CM

## 2025-04-15 DIAGNOSIS — G43.809 OTHER MIGRAINE WITHOUT STATUS MIGRAINOSUS, NOT INTRACTABLE: Primary | ICD-10-CM

## 2025-04-15 DIAGNOSIS — R73.03 PREDIABETES: ICD-10-CM

## 2025-04-15 DIAGNOSIS — Z13.29 THYROID DISORDER SCREEN: ICD-10-CM

## 2025-04-15 PROBLEM — G43.909 MIGRAINE: Status: ACTIVE | Noted: 2025-04-15

## 2025-04-15 PROCEDURE — G2211 COMPLEX E/M VISIT ADD ON: HCPCS | Performed by: STUDENT IN AN ORGANIZED HEALTH CARE EDUCATION/TRAINING PROGRAM

## 2025-04-15 PROCEDURE — 99214 OFFICE O/P EST MOD 30 MIN: CPT | Performed by: STUDENT IN AN ORGANIZED HEALTH CARE EDUCATION/TRAINING PROGRAM

## 2025-04-15 RX ORDER — ERYTHROMYCIN 5 MG/G
0.5 OINTMENT OPHTHALMIC
COMMUNITY
Start: 2025-02-18

## 2025-04-15 NOTE — ASSESSMENT & PLAN NOTE
Reviewed recent lipid panel completed with life insurance, total cholesterol as well as LDL increasing.  Patient does report family history of heart disease in her mom who  from sudden cardiac death.  At this time I will continue atorvastatin 10 mg daily.  Patient has made significant changes with her diet and is only plant-based.  She does plan to increase physical activity as well.  Will recheck labs in 6 months and if still increasing will increase atorvastatin and consider adding fenofibrate.  Orders:  •  Lipid panel; Future

## 2025-04-15 NOTE — ASSESSMENT & PLAN NOTE
Patient reports once a month migraines, using indomethacin as needed, was previously seeing neurology in [facility] and at this time would like local neurology referral.  Orders:  •  Ambulatory Referral to Neurology; Future  •  CBC and Platelet; Future

## 2025-04-15 NOTE — ASSESSMENT & PLAN NOTE
Orders:  •  Hemoglobin A1C; Future  •  Comprehensive metabolic panel; Future  •  CBC and Platelet; Future

## 2025-05-13 DIAGNOSIS — E78.01 FAMILIAL HYPERCHOLESTEROLEMIA: Primary | ICD-10-CM

## 2025-05-13 NOTE — TELEPHONE ENCOUNTER
Reason for call:   [x] Refill   [] Prior Auth  [] Other: last filled by previous provider she no longer sees    Office:   [x] PCP/Provider - Dr Carrasco   [] Specialty/Provider -     Medication: atorvastatin    Dose/Frequency: 10 mg take daily     Quantity: 90    Pharmacy: [facility] on [address]     Local Pharmacy   Does the patient have enough for 3 days?   [x] Yes   [] No - Send as HP to POD    Mail Away Pharmacy   Does the patient have enough for 10 days?   [] Yes   [] No - Send as HP to POD

## 2025-05-14 RX ORDER — ATORVASTATIN CALCIUM 10 MG/1
10 TABLET, FILM COATED ORAL
Qty: 90 TABLET | Refills: 1 | Status: SHIPPED | OUTPATIENT
Start: 2025-05-14

## 2025-05-16 ENCOUNTER — NURSE TRIAGE (OUTPATIENT)
Age: 65
End: 2025-05-16

## 2025-05-16 ENCOUNTER — HOSPITAL ENCOUNTER (EMERGENCY)
Facility: HOSPITAL | Age: 65
Discharge: HOME | End: 2025-05-16
Payer: MEDICARE

## 2025-05-16 VITALS
OXYGEN SATURATION: 99 % | HEART RATE: 104 BPM | DIASTOLIC BLOOD PRESSURE: 85 MMHG | SYSTOLIC BLOOD PRESSURE: 134 MMHG | RESPIRATION RATE: 17 BRPM | TEMPERATURE: 98 F

## 2025-05-16 DIAGNOSIS — S61.011A LACERATION OF RIGHT THUMB: Primary | ICD-10-CM

## 2025-05-16 PROCEDURE — 12001 RPR S/N/AX/GEN/TRNK 2.5CM/<: CPT

## 2025-05-16 PROCEDURE — 90471 IMMUNIZATION ADMIN: CPT

## 2025-05-16 PROCEDURE — 99284 EMERGENCY DEPT VISIT MOD MDM: CPT

## 2025-05-16 PROCEDURE — 90715 TDAP VACCINE 7 YRS/> IM: CPT

## 2025-05-16 PROCEDURE — 99282 EMERGENCY DEPT VISIT SF MDM: CPT

## 2025-05-16 RX ORDER — GINSENG 100 MG
1 CAPSULE ORAL ONCE
Status: COMPLETED | OUTPATIENT
Start: 2025-05-16 | End: 2025-05-16

## 2025-05-16 RX ORDER — LIDOCAINE HYDROCHLORIDE 20 MG/ML
10 INJECTION, SOLUTION EPIDURAL; INFILTRATION; INTRACAUDAL; PERINEURAL ONCE
Status: COMPLETED | OUTPATIENT
Start: 2025-05-16 | End: 2025-05-16

## 2025-05-16 RX ORDER — ACETAMINOPHEN 325 MG/1
650 TABLET ORAL ONCE
Status: COMPLETED | OUTPATIENT
Start: 2025-05-16 | End: 2025-05-16

## 2025-05-16 RX ADMIN — LIDOCAINE HYDROCHLORIDE 10 ML: 20 INJECTION, SOLUTION EPIDURAL; INFILTRATION; INTRACAUDAL; PERINEURAL at 17:48

## 2025-05-16 RX ADMIN — TETANUS TOXOID, REDUCED DIPHTHERIA TOXOID AND ACELLULAR PERTUSSIS VACCINE, ADSORBED 0.5 ML: 5; 2.5; 8; 8; 2.5 SUSPENSION INTRAMUSCULAR at 17:49

## 2025-05-16 RX ADMIN — BACITRACIN ZINC 1 LARGE APPLICATION: 500 OINTMENT TOPICAL at 17:49

## 2025-05-16 RX ADMIN — ACETAMINOPHEN 650 MG: 325 TABLET ORAL at 17:49

## 2025-05-16 NOTE — DISCHARGE INSTRUCTIONS
Suture removal for 10-14 days.   Ointment x2 a day. Keep wound clean.     Follow-up with your PCP and return to the ER for any worsening symptoms.

## 2025-05-16 NOTE — TELEPHONE ENCOUNTER
"FOLLOW UP: Patient advice to go to the ED for further evaluation.    REASON FOR CONVERSATION: Laceration    SYMPTOMS: deep cut on the thumb,uncontrollable bleeding     OTHER: Patient reports she takes aspirin 81 mg daily, and she cut her finger while cutting zucchini. Patient applied pressure and bleeding continues even after 10 minutes.    DISPOSITION: Go to ED Now    Reason for Disposition   Bleeding and won't stop after 10 minutes of direct pressure (using correct technique)    Answer Assessment - Initial Assessment Questions  1. APPEARANCE of INJURY: \"What does the injury look like?\"       Deep cut on thumb     2. ONSET: \"How long ago did the injury occur?\"       About 10 minutes before phone call    3. LOCATION: \"Where is the injury located?\"       Thumb    4. SIZE: \"How large is the cut?\"       Patient reports a deep cut    5. BLEEDING: \"Is it bleeding now?\" If Yes, ask: \"Is it difficult to stop?\"       Yes, patient states she's been applying pressure for 10 minutes and it's not stopping      7. MECHANISM: \"Tell me how it happened.\"       Patient was cutting zucchini mandolin    Protocols used: Cuts and Lacerations-Adult-[place]    "

## 2025-05-16 NOTE — ED PROVIDER NOTES
Time reflects when diagnosis was documented in both MDM as applicable and the Disposition within this note       Time User Action Codes Description Comment    5/16/2025  6:06 PM Abbey Stark Add [S61.011A] Laceration of right thumb           ED Disposition       ED Disposition   Discharge    Condition   Stable    Date/Time   Fri May 16, 2025  6:06 PM    Comment   Albertina Alvarado discharge to home/self care.                   Assessment & Plan       Medical Decision Making  Differential diagnosis includes but is not limited to abrasion, laceration, soft tissue injury, etc.    VSS and patient well-appearing throughout ER course.  Tetanus updated.  Laceration repaired, patient tolerated well.  Provided patient education and discussed return precautions.  Patient to follow-up with her PCP and return to the ER for any worsening symptoms.  All questions and concerns addressed and answered appropriately.  Patient verbalizes understanding and agrees to discharge plan.  Patient also provided with written discharge instructions.    Risk  OTC drugs.  Prescription drug management.             Medications   tetanus-diphtheria-acellular pertussis (BOOSTRIX) IM injection 0.5 mL (0.5 mL Intramuscular Given 5/16/25 1749)   lidocaine (PF) (XYLOCAINE-MPF) 2 % injection 10 mL (10 mL Infiltration Given 5/16/25 1748)   bacitracin topical ointment 1 large application (1 large application Topical Given 5/16/25 1749)   acetaminophen (TYLENOL) tablet 650 mg (650 mg Oral Given 5/16/25 1749)       ED Risk Strat Scores                    No data recorded                            History of Present Illness       Chief Complaint   Patient presents with    Finger Laceration     Pt cut her right thumb mild using a mandolin , +BT       Past Medical History:   Diagnosis Date    Anxiety 09/2023    Depression 4 years    Headache(784.0) 24 years    Shingles       Past Surgical History:   Procedure Laterality Date    BREAST BIOPSY Right 01/01/2014     BREAST BIOPSY Left 2014    EYE SURGERY      HAND SURGERY      US GUIDED BREAST BIOPSY LEFT COMPLETE Left 2014      Family History   Problem Relation Age of Onset    Heart disease Mother          2012    Hypertension Father          1988    Cancer Father          1988    Colon cancer Father             No Known Problems Sister     No Known Problems Daughter     No Known Problems Maternal Grandmother     No Known Problems Paternal Grandmother     Depression Son     Asthma Son         Out-grew in his teens    No Known Problems Maternal Aunt     No Known Problems Paternal Aunt     No Known Problems Paternal Aunt     No Known Problems Paternal Aunt     Breast cancer Neg Hx       Social History[1]   E-Cigarette/Vaping    E-Cigarette Use Never User       E-Cigarette/Vaping Substances    Nicotine No     THC No     CBD No     Flavoring No     Other No     Unknown No       I have reviewed and agree with the history as documented.     Patient is a 65-year-old female who presents to the emergency room for finger laceration.  Reports laceration to her right thumb after cutting a zucchini prior to ED arrival.  Denies any other trauma or injury.  Reports bleeding is uncontrolled.  Denies any other symptoms. +ASA. Unsure of tetanus status.       Finger Laceration  Associated symptoms: no fever and no rash        Review of Systems   Constitutional:  Negative for chills and fever.   HENT:  Negative for ear pain, sore throat, trouble swallowing and voice change.    Eyes:  Negative for pain and visual disturbance.   Respiratory:  Negative for cough and shortness of breath.    Cardiovascular:  Negative for chest pain and palpitations.   Gastrointestinal:  Negative for abdominal pain, nausea and vomiting.   Genitourinary:  Negative for dysuria, flank pain and hematuria.   Musculoskeletal:  Negative for arthralgias and back pain.   Skin:  Positive for wound. Negative for color  change and rash.   Neurological:  Negative for seizures, syncope and headaches.   Psychiatric/Behavioral:  Negative for confusion.    All other systems reviewed and are negative.          Objective       ED Triage Vitals [05/16/25 1644]   Temperature Pulse Blood Pressure Respirations SpO2 Patient Position - Orthostatic VS   98 °F (36.7 °C) 104 134/85 17 99 % Sitting      Temp Source Heart Rate Source BP Location FiO2 (%) Pain Score    Temporal Monitor Left arm -- --      Vitals      Date and Time Temp Pulse SpO2 Resp BP Pain Score FACES Pain Rating User   05/16/25 1644 98 °F (36.7 °C) 104 99 % 17 134/85 -- -- AC            Physical Exam  Vitals and nursing note reviewed.   Constitutional:       General: She is not in acute distress.     Appearance: Normal appearance. She is well-developed.   HENT:      Head: Normocephalic and atraumatic.     Eyes:      Conjunctiva/sclera: Conjunctivae normal.       Cardiovascular:      Rate and Rhythm: Normal rate and regular rhythm.      Pulses: Normal pulses.      Heart sounds: No murmur heard.  Pulmonary:      Effort: Pulmonary effort is normal. No respiratory distress.      Breath sounds: Normal breath sounds.   Abdominal:      Palpations: Abdomen is soft.      Tenderness: There is no abdominal tenderness.     Musculoskeletal:         General: No swelling.      Cervical back: Normal range of motion and neck supple.     Skin:     General: Skin is warm and dry.      Capillary Refill: Capillary refill takes less than 2 seconds.      Findings: Laceration present.          Neurological:      General: No focal deficit present.      Mental Status: She is alert and oriented to person, place, and time.     Psychiatric:         Mood and Affect: Mood normal.         Results Reviewed       None            No orders to display         Universal Protocol:  Consent: Verbal consent obtained  Risks and benefits: risks, benefits and alternatives were discussed  Consent given by: patient  Time  "out: Immediately prior to procedure a \"time out\" was called to verify the correct patient, procedure, equipment, support staff and site/side marked as required.  Timeout called at: 5/16/2025 5:43 PM.  Patient understanding: patient states understanding of the procedure being performed  Patient consent: the patient's understanding of the procedure matches consent given  Procedure consent: procedure consent matches procedure scheduled  Relevant documents: relevant documents present and verified  Test results: test results available and properly labeled  Site marked: the operative site was marked  Required items: required blood products, implants, devices, and special equipment available  Laceration repair    Date/Time: 5/16/2025 5:43 PM    Performed by: Abbey Stark PA-C  Authorized by: Abbey Stark PA-C  Body area: upper extremity  Location details: right thumb  Laceration length: 1 cm  Anesthesia: local infiltration    Anesthesia:  Local Anesthetic: lidocaine 2% without epinephrine      Procedure Details:  Preparation: Patient was prepped and draped in the usual sterile fashion.  Irrigation solution: saline  Irrigation method: syringe  Amount of cleaning: standard  Skin closure: 4-0 nylon  Number of sutures: 5  Technique: simple  Approximation: close  Approximation difficulty: simple  Dressing: 4x4 sterile gauze and antibiotic ointment  Patient tolerance: patient tolerated the procedure well with no immediate complications          ED Medication and Procedure Management   Prior to Admission Medications   Prescriptions Last Dose Informant Patient Reported? Taking?   Calcium Citrate-Vitamin D (CALCIUM CITRATE + D PO)  Self Yes No   Sig: Take by mouth   Melatonin 10 MG TABS  Self Yes No   Sig: Take by mouth   VITAMIN D, CHOLECALCIFEROL, PO  Self Yes No   Sig: Take 25 mcg by mouth   aspirin (ECOTRIN LOW STRENGTH) 81 mg EC tablet  Self Yes No   Sig: Take 81 mg by mouth daily at bedtime   atorvastatin (LIPITOR) 10 " mg tablet   No No   Sig: Take 1 tablet (10 mg total) by mouth daily at bedtime   brimonidine tartrate 0.2 % ophthalmic solution  Self Yes No   Sig: instill 1 drop into both eyes twice a day   erythromycin (ILOTYCIN) ophthalmic ointment   Yes No   Sig: Administer 0.5 inches into the left eye daily at bedtime   indomethacin (INDOCIN) 25 mg capsule  Self Yes No   Sig: TAKE 1 CAPSULE (25 MG) BY MOUTH 2 TIMES A DAY AS NEEDED FOR PAIN.   latanoprost (XALATAN) 0.005 % ophthalmic solution  Self Yes No   Sig: Administer 1 drop to both eyes daily at bedtime   meclizine (ANTIVERT) 25 mg tablet  Self No No   Sig: Take 1 tablet (25 mg total) by mouth every 12 (twelve) hours as needed for dizziness   potassium chloride (KLOR-CON) 8 MEQ tablet  Self No No   Sig: Take 0.5 tablets (4 mEq total) by mouth daily at bedtime      Facility-Administered Medications: None     Patient's Medications   Discharge Prescriptions    No medications on file     No discharge procedures on file.  ED SEPSIS DOCUMENTATION   Time reflects when diagnosis was documented in both MDM as applicable and the Disposition within this note       Time User Action Codes Description Comment    2025  6:06 PM Abbey Stark Add [S61.011A] Laceration of right thumb                    [1]   Social History  Tobacco Use    Smoking status: Former     Average packs/day: 0.5 packs/day for 30.0 years (15.0 ttl pk-yrs)     Types: Cigarettes     Start date: 2023     Quit date:      Years since quittin.3     Passive exposure: Never    Smokeless tobacco: Never   Vaping Use    Vaping status: Never Used   Substance Use Topics    Alcohol use: Never    Drug use: Never        Abbey Stark PA-C  25 [id number]

## 2025-07-21 ENCOUNTER — TELEPHONE (OUTPATIENT)
Age: 65
End: 2025-07-21

## 2025-07-21 NOTE — TELEPHONE ENCOUNTER
Patients daughter called in to see if Patient can be moved into a sooner appointment.    Advised no opening at this time.  Patient was added to wait list.

## 2025-07-22 ENCOUNTER — HOSPITAL ENCOUNTER (OUTPATIENT)
Age: 65
Discharge: HOME | End: 2025-07-22
Payer: MEDICARE

## 2025-07-22 DIAGNOSIS — Z12.31 ENCOUNTER FOR SCREENING MAMMOGRAM FOR MALIGNANT NEOPLASM OF BREAST: ICD-10-CM

## 2025-07-22 PROCEDURE — 77067 SCR MAMMO BI INCL CAD: CPT

## 2025-07-22 PROCEDURE — 77063 BREAST TOMOSYNTHESIS BI: CPT
